# Patient Record
Sex: MALE | Race: WHITE | Employment: OTHER | URBAN - NONMETROPOLITAN AREA
[De-identification: names, ages, dates, MRNs, and addresses within clinical notes are randomized per-mention and may not be internally consistent; named-entity substitution may affect disease eponyms.]

---

## 2021-11-29 ENCOUNTER — OFFICE VISIT (OUTPATIENT)
Dept: ONCOLOGY | Age: 71
End: 2021-11-29
Payer: MEDICARE

## 2021-11-29 ENCOUNTER — HOSPITAL ENCOUNTER (OUTPATIENT)
Dept: INFUSION THERAPY | Age: 71
Discharge: HOME | End: 2021-11-29
Payer: MEDICARE

## 2021-11-29 VITALS
TEMPERATURE: 98.7 F | BODY MASS INDEX: 29.67 KG/M2 | RESPIRATION RATE: 18 BRPM | DIASTOLIC BLOOD PRESSURE: 76 MMHG | OXYGEN SATURATION: 95 % | SYSTOLIC BLOOD PRESSURE: 117 MMHG | HEIGHT: 68 IN | WEIGHT: 195.8 LBS | HEART RATE: 58 BPM

## 2021-11-29 DIAGNOSIS — D75.839 THROMBOCYTOSIS: Primary | ICD-10-CM

## 2021-11-29 DIAGNOSIS — R79.9 ABNORMAL FINDING OF BLOOD CHEMISTRY, UNSPECIFIED: ICD-10-CM

## 2021-11-29 DIAGNOSIS — D75.839 THROMBOCYTOSIS: ICD-10-CM

## 2021-11-29 LAB
BASOPHILS # BLD: 0.7 %
BASOPHILS ABSOLUTE: 0 THOU/MM3 (ref 0–0.1)
EOSINOPHIL # BLD: 3.5 %
EOSINOPHILS ABSOLUTE: 0.2 THOU/MM3 (ref 0–0.4)
ERYTHROCYTE [DISTWIDTH] IN BLOOD BY AUTOMATED COUNT: 13 % (ref 11.5–14.5)
ERYTHROCYTE [DISTWIDTH] IN BLOOD BY AUTOMATED COUNT: 46.5 FL (ref 35–45)
HCT VFR BLD CALC: 45.3 % (ref 42–52)
HEMOGLOBIN: 14.9 GM/DL (ref 14–18)
IMMATURE GRANS (ABS): 0.02 THOU/MM3 (ref 0–0.07)
IMMATURE GRANULOCYTES: 0.3 %
LYMPHOCYTES # BLD: 33.4 %
LYMPHOCYTES ABSOLUTE: 2.3 THOU/MM3 (ref 1–4.8)
MCH RBC QN AUTO: 32 PG (ref 26–33)
MCHC RBC AUTO-ENTMCNC: 32.9 GM/DL (ref 32.2–35.5)
MCV RBC AUTO: 97.4 FL (ref 80–94)
MONOCYTES # BLD: 8.3 %
MONOCYTES ABSOLUTE: 0.6 THOU/MM3 (ref 0.4–1.3)
NUCLEATED RED BLOOD CELLS: 0 /100 WBC
PLATELET # BLD: 795 THOU/MM3 (ref 130–400)
PMV BLD AUTO: 10.2 FL (ref 9.4–12.4)
RBC # BLD: 4.65 MILL/MM3 (ref 4.7–6.1)
SEDIMENTATION RATE, ERYTHROCYTE: 1 MM/HR (ref 0–10)
SEG NEUTROPHILS: 53.8 %
SEGMENTED NEUTROPHILS ABSOLUTE COUNT: 3.7 THOU/MM3 (ref 1.8–7.7)
WBC # BLD: 6.9 THOU/MM3 (ref 4.8–10.8)

## 2021-11-29 PROCEDURE — 99211 OFF/OP EST MAY X REQ PHY/QHP: CPT

## 2021-11-29 PROCEDURE — 36415 COLL VENOUS BLD VENIPUNCTURE: CPT

## 2021-11-29 PROCEDURE — 4040F PNEUMOC VAC/ADMIN/RCVD: CPT | Performed by: INTERNAL MEDICINE

## 2021-11-29 PROCEDURE — 3017F COLORECTAL CA SCREEN DOC REV: CPT | Performed by: INTERNAL MEDICINE

## 2021-11-29 PROCEDURE — 82728 ASSAY OF FERRITIN: CPT

## 2021-11-29 PROCEDURE — 1036F TOBACCO NON-USER: CPT | Performed by: INTERNAL MEDICINE

## 2021-11-29 PROCEDURE — 1123F ACP DISCUSS/DSCN MKR DOCD: CPT | Performed by: INTERNAL MEDICINE

## 2021-11-29 PROCEDURE — G8484 FLU IMMUNIZE NO ADMIN: HCPCS | Performed by: INTERNAL MEDICINE

## 2021-11-29 PROCEDURE — 81270 JAK2 GENE: CPT

## 2021-11-29 PROCEDURE — 83615 LACTATE (LD) (LDH) ENZYME: CPT

## 2021-11-29 PROCEDURE — G8427 DOCREV CUR MEDS BY ELIG CLIN: HCPCS | Performed by: INTERNAL MEDICINE

## 2021-11-29 PROCEDURE — 85025 COMPLETE CBC W/AUTO DIFF WBC: CPT

## 2021-11-29 PROCEDURE — 80053 COMPREHEN METABOLIC PANEL: CPT

## 2021-11-29 PROCEDURE — 99203 OFFICE O/P NEW LOW 30 MIN: CPT | Performed by: INTERNAL MEDICINE

## 2021-11-29 PROCEDURE — 85651 RBC SED RATE NONAUTOMATED: CPT

## 2021-11-29 PROCEDURE — 82248 BILIRUBIN DIRECT: CPT

## 2021-11-29 PROCEDURE — G8417 CALC BMI ABV UP PARAM F/U: HCPCS | Performed by: INTERNAL MEDICINE

## 2021-11-29 RX ORDER — UREA 10 %
LOTION (ML) TOPICAL
COMMUNITY

## 2021-11-29 RX ORDER — MELOXICAM 15 MG/1
TABLET ORAL
COMMUNITY

## 2021-11-29 RX ORDER — AMLODIPINE BESYLATE 5 MG/1
TABLET ORAL
COMMUNITY
Start: 2021-08-24 | End: 2022-10-18

## 2021-11-29 RX ORDER — TAMSULOSIN HYDROCHLORIDE 0.4 MG/1
CAPSULE ORAL
COMMUNITY
Start: 2021-11-23

## 2021-11-29 RX ORDER — ASPIRIN 81 MG/1
TABLET ORAL
COMMUNITY

## 2021-11-29 RX ORDER — METHOTREXATE 2.5 MG/1
2.5 TABLET ORAL
COMMUNITY
Start: 2021-11-23

## 2021-11-29 RX ORDER — ASCORBIC ACID 500 MG
TABLET ORAL
COMMUNITY

## 2021-11-29 RX ORDER — MAGNESIUM GLUCONATE 27 MG(500)
500 TABLET ORAL 2 TIMES DAILY
COMMUNITY

## 2021-11-29 RX ORDER — ENALAPRIL MALEATE AND HYDROCHLOROTHIAZIDE 10; 25 MG/1; MG/1
1 TABLET ORAL DAILY
COMMUNITY
Start: 2021-11-23

## 2021-11-30 LAB
ALBUMIN SERPL-MCNC: 4.2 G/DL (ref 3.5–5.1)
ALP BLD-CCNC: 64 U/L (ref 38–126)
ALT SERPL-CCNC: 13 U/L (ref 11–66)
ANION GAP SERPL CALCULATED.3IONS-SCNC: 10 MEQ/L (ref 8–16)
AST SERPL-CCNC: 26 U/L (ref 5–40)
BILIRUB SERPL-MCNC: 0.6 MG/DL (ref 0.3–1.2)
BILIRUBIN DIRECT: < 0.2 MG/DL (ref 0–0.3)
BUN BLDV-MCNC: 19 MG/DL (ref 7–22)
CALCIUM SERPL-MCNC: 9.3 MG/DL (ref 8.5–10.5)
CHLORIDE BLD-SCNC: 102 MEQ/L (ref 98–111)
CO2: 28 MEQ/L (ref 23–33)
CREAT SERPL-MCNC: 0.9 MG/DL (ref 0.4–1.2)
FERRITIN: 58 NG/ML (ref 22–322)
GFR SERPL CREATININE-BSD FRML MDRD: 83 ML/MIN/1.73M2
GLUCOSE BLD-MCNC: 92 MG/DL (ref 70–108)
LD: 218 U/L (ref 100–190)
POTASSIUM SERPL-SCNC: 3.9 MEQ/L (ref 3.5–5.2)
SODIUM BLD-SCNC: 140 MEQ/L (ref 135–145)
TOTAL PROTEIN: 6.4 G/DL (ref 6.1–8)

## 2021-12-16 LAB
JAK2 GENE MUTATION QUAL: NORMAL
Lab: NORMAL

## 2021-12-22 NOTE — PROGRESS NOTES
[facility] CANCER CENTER  CANCER NETWORK OF [facility]  [facility] [address] [facility]  [address] [place] [address]  Dept: [phone]  Dept Fax: [phone]: [phone]     Encounter Date: 11/29/2021    Referring Physician:  Bryon Mars MD     Primary Provider: Nadia Lincoln MD     Subjective:      Chief Complaint:  Maurisio Bearden is a 80 y.o. with an elevated platelet count. HPI:  This is the first visit to the [facility] for this patient who was referred by Bryon Mars MD for evaluation of thrombocytosis. The patient has been in fairly good health the majority of his life. He was first noted to have an elevated platelet count in December 2020. At that time his platelet count was 211,547. He had a similar result in March 2021. Since that time his platelet count has increased. In June 2021 his platelet count was 633,975. In September 2021 on 2 occasions it was 721,007 127,000 respectively. In context of this condition the patient's total white blood cell count, white blood cell differential, hemoglobin, hematocrit have been unremarkable. The patient does not report any family history of hematological disease. He currently takes an aspirin on a daily basis. The patient does not have any specific signs or symptoms related to his thrombocytosis. He has had no abnormal bleeding or bruising. The patient has had no evidence of thrombosis. He has not had fever, cough, shortness of breath or other signs of infection. The patient does complain of chronic joint pain and is on meloxicam for this discomfort. He is active and has an ECOG performance status of level 0. Past Medical History  She  has a past medical history of Arthritis, Cancer ([address] and Hypertension. Surgical History  She  has a past surgical history that includes hernia repair (Right, 2013);  Appendectomy; back surgery (1996); and Dental surgery (2013). Home Medications  She has a current medication list which includes the following prescription(s): amlodipine, meloxicam, enalapril-hydrochlorothiazide, methotrexate, tamsulosin, aspirin, ascorbic acid, calcium carbonate, vitamin d, folic acid, magnesium gluconate, and potassium. Allergies  No Known Allergies    Social History  She  reports that he quit smoking about 46 years ago. His smoking use included cigarettes. He started smoking about 48 years ago. He has a 2.00 pack-year smoking history. He has never used smokeless tobacco. He reports previous alcohol use. He reports that he does not use drugs. Family History  Her family history includes Arthritis in his mother; Dementia in his mother; Diabetes in his father and mother. Review of Systems  Constitutional: Negative. HENT: Hearing loss. Eyes: Vision changes. Respiratory: Negative. Cardiovascular: Negative. Gastrointestinal: Negative. Genitourinary: Frequency, lack of control. Musculoskeletal: Joint pain, osteoporosis. Skin: Negative. Neurological: Negative. Hematological: Negative. Psychiatric/Behavioral: Negative. Objective:   Physical Exam  Vitals:    11/29/21 1512   BP: 117/76   Pulse: 58   Resp: 18   Temp: 98.7 °F (37.1 °C)   SpO2: 95%   Vitals reviewed and are stable. Constitutional: Well-developed. No acute distress. HENT: Normocephalic and atraumatic. Eyes: Pupils appear equal and reactive. Neck: Overall appearance is symmetrical. No identifiable masses. Pulmonary: Effort normal. No respiratory distress. .  Neurological: Alert and oriented to person, place, and time. Judgment and thought content normal.  Skin: Skin is warm and dry. No rash. Psychiatric: Mood and affect appropriate for the clinical situation. Assessment:   1. Thrombocytosis     Plan:   1.   Obtain the following laboratory studies today to further evaluate his elevated platelet count:   Hepatic Function Panel    Sedimentation Rate    JAK2 V617F with Reflex to CALR & MPL    Lactate Dehydrogenase    Ferritin    CBC Auto Differential   2. Continue daily aspirin. 3.  Monitor platelet count and for any signs of abnormal bleeding/bruising. 4.  Consider therapy with hydroxyurea or Agrylin depending on results of the above laboratory studies and further elevation of his platelet count. Multiple questions were answered throughout the course the consultation. By the end of the consultation, all the patient's questions had been answered. The patient was asked to call if there were any questions or concerns prior to the next scheduled clinic visit. Page Caal M.D. Medical Director: [facility]  Cancer Network [facility]  [address], [address] [place] [address], [address] [place] [address], [address] [place] [address] of the [facility] at the [facility]      **This report has been created using voice recognition software. It may contain minor errors which are inherent in voice recognition technology. **

## 2022-01-11 ENCOUNTER — HOSPITAL ENCOUNTER (OUTPATIENT)
Dept: INFUSION THERAPY | Age: 72
Discharge: HOME | End: 2022-01-11
Payer: MEDICARE

## 2022-01-11 ENCOUNTER — OFFICE VISIT (OUTPATIENT)
Dept: ONCOLOGY | Age: 72
End: 2022-01-11
Payer: MEDICARE

## 2022-01-11 VITALS
OXYGEN SATURATION: 97 % | DIASTOLIC BLOOD PRESSURE: 73 MMHG | SYSTOLIC BLOOD PRESSURE: 111 MMHG | RESPIRATION RATE: 16 BRPM | HEIGHT: 68 IN | WEIGHT: 200.6 LBS | TEMPERATURE: 98.1 F | BODY MASS INDEX: 30.4 KG/M2 | HEART RATE: 71 BPM

## 2022-01-11 DIAGNOSIS — D47.1 MYELOPROLIFERATIVE DISORDER (HCC): ICD-10-CM

## 2022-01-11 DIAGNOSIS — Z15.89 JAK2 GENE MUTATION: ICD-10-CM

## 2022-01-11 DIAGNOSIS — R79.9 ABNORMAL FINDING OF BLOOD CHEMISTRY, UNSPECIFIED: ICD-10-CM

## 2022-01-11 DIAGNOSIS — M19.90 ARTHRITIS: ICD-10-CM

## 2022-01-11 DIAGNOSIS — D75.839 THROMBOCYTOSIS: ICD-10-CM

## 2022-01-11 DIAGNOSIS — D75.839 THROMBOCYTOSIS: Primary | ICD-10-CM

## 2022-01-11 LAB
ABSOLUTE IMMATURE GRANULOCYTE: 0.02 THOU/MM3 (ref 0–0.07)
BASINOPHIL, AUTOMATED: 1 % (ref 0–3)
BASOPHILS ABSOLUTE: 0 THOU/MM3 (ref 0–0.1)
EOSINOPHILS ABSOLUTE: 0.2 THOU/MM3 (ref 0–0.4)
EOSINOPHILS RELATIVE PERCENT: 3 % (ref 0–4)
HCT VFR BLD CALC: 41.8 % (ref 42–52)
HEMOGLOBIN: 14 GM/DL (ref 14–18)
IMMATURE GRANULOCYTES: 0 %
LYMPHOCYTES # BLD: 20 % (ref 15–47)
LYMPHOCYTES ABSOLUTE: 1.3 THOU/MM3 (ref 1–4.8)
MCH RBC QN AUTO: 31.6 PG (ref 26–33)
MCHC RBC AUTO-ENTMCNC: 33.5 GM/DL (ref 32.2–35.5)
MCV RBC AUTO: 94 FL (ref 80–94)
MONOCYTES ABSOLUTE: 0.5 THOU/MM3 (ref 0.4–1.3)
MONOCYTES: 8 % (ref 0–12)
PDW BLD-RTO: 13.2 % (ref 11.5–14.5)
PLATELET # BLD: 783 THOU/MM3 (ref 130–400)
PMV BLD AUTO: 9.8 FL (ref 9.4–12.4)
RBC # BLD: 4.43 MILL/MM3 (ref 4.7–6.1)
SEG NEUTROPHILS: 68 % (ref 43–75)
SEGMENTED NEUTROPHILS ABSOLUTE COUNT: 4.2 THOU/MM3 (ref 1.8–7.7)
WBC # BLD: 6.2 THOU/MM3 (ref 4.8–10.8)

## 2022-01-11 PROCEDURE — 1123F ACP DISCUSS/DSCN MKR DOCD: CPT | Performed by: INTERNAL MEDICINE

## 2022-01-11 PROCEDURE — 3017F COLORECTAL CA SCREEN DOC REV: CPT | Performed by: INTERNAL MEDICINE

## 2022-01-11 PROCEDURE — 4040F PNEUMOC VAC/ADMIN/RCVD: CPT | Performed by: INTERNAL MEDICINE

## 2022-01-11 PROCEDURE — G8484 FLU IMMUNIZE NO ADMIN: HCPCS | Performed by: INTERNAL MEDICINE

## 2022-01-11 PROCEDURE — 1036F TOBACCO NON-USER: CPT | Performed by: INTERNAL MEDICINE

## 2022-01-11 PROCEDURE — 99211 OFF/OP EST MAY X REQ PHY/QHP: CPT

## 2022-01-11 PROCEDURE — 99214 OFFICE O/P EST MOD 30 MIN: CPT | Performed by: INTERNAL MEDICINE

## 2022-01-11 PROCEDURE — G8417 CALC BMI ABV UP PARAM F/U: HCPCS | Performed by: INTERNAL MEDICINE

## 2022-01-11 PROCEDURE — G8427 DOCREV CUR MEDS BY ELIG CLIN: HCPCS | Performed by: INTERNAL MEDICINE

## 2022-01-11 PROCEDURE — 85025 COMPLETE CBC W/AUTO DIFF WBC: CPT

## 2022-01-11 NOTE — PROGRESS NOTES
[facility] CANCER CENTER  CANCER NETWORK OF [facility]  [facility] [address] [facility] 98  [address], [facility] [address]  [address]  Dept: [phone]  Dept Fax: [phone]: [phone]     Encounter Date: 01/11/2022    Primary Provider: Akosua Jacobs MD     Subjective:      Chief Complaint:  Yesenia Dillard is a 80 y.o. with an elevated platelet count. He was first noted to have an elevated platelet count in December 2020. At that time his platelet count was 056,269. He had a similar result in March 2021. Since that time his platelet count has increased. In June 2021 his platelet count was 539,253. In September 2021 on 2 occasions it was 721,007 127,000 respectively    HPI:   The patient returns today for follow-up regarding his history of thrombocytosis. On his initial visit here the patient had laboratory studies completed and he has been confirmed to be JAK2 positive for mutation consistent with a myeloproliferative disorder. This was reviewed with the patient today. Laboratory studies from today were reviewed with the patient. The patient continues to have an elevated platelet count. He does not have any specific symptoms related to his thrombocytosis. Of note, the patient is on methotrexate for treatment of arthritis. He has not had fever, cough, shortness of breath or other signs of infection. The patient's bowel and bladder habits have been normal.  He has not seen blood in his stool or urine. The patient remains active with an ECOG performance status of level 0. PMH, SH, and FH:  I reviewed the patients medication list and allergy list as noted on the electronic medical record. The PMH, SH and FH were also reviewed as noted on the EMR. Review of Systems  Constitutional: Negative. HENT: Negative. Eyes: Negative. Respiratory: Negative. Cardiovascular: Negative. Gastrointestinal: Negative.   Genitourinary: Negative. Musculoskeletal: Joint pain. Skin: Negative. Neurological: Negative. Hematological: Negative. Psychiatric/Behavioral: Negative. Objective:   Physical Exam  Vitals:    01/11/22 1111   BP: 111/73   Pulse: 71   Resp: 16   Temp: 98.1 °F (36.7 °C)   SpO2: 97%   Vitals reviewed and are stable. Constitutional: Well-developed. No acute distress. HENT: Normocephalic and atraumatic. Eyes: Pupils appear equal and reactive. Neck: Overall appearance is symmetrical. No identifiable masses. Pulmonary: Effort normal. No respiratory distress. .  Neurological: Alert and oriented to person, place, and time. Judgment and thought content normal.  Skin: Skin is warm and dry. No rash. Psychiatric: Mood and affect appropriate for the clinical situation. Data Analysis: The following laboratory studies were reviewed with the patient today:    Hematology 1/11/2022 11/29/2021   WBC 6.2 6.9   RBC 4.43 (L) 4.65 (L)   HGB 14.0 14.9   HCT 41.8 (L) 45.3   MCV 94 97.4 (H)   RDW 13.2     (H) 795 (H)   ESR  1   Ferritin  58     Assessment:   1. Essential thrombocytosis. 2.  JAK2 mutation myeloproliferative disorder. 3.  Arthritis. Plan:   1. Monthly CBC to monitor the trend of the patient's thrombocytosis. 2.  Monitor platelet count and for any signs of abnormal bleeding/bruising. 3.  Continue current therapy with methotrexate as outlined for arthritis by patient's primary care provider. Multiple questions were answered throughout the course the consultation. By the end of the consultation, all the patient's questions had been answered. The patient was asked to call if there were any questions or concerns prior to the next scheduled clinic visit. Karli Chatman M.D.                                                                          Medical Director: [facility]  Cancer Network of [facility] [address] [place] [address], [address] [place] [address], [address] [place] [address], [address] of the [facility] Theron BRAGA at the [facility]      **This report has been created using voice recognition software. It may contain minor errors which are inherent in voice recognition technology. **

## 2022-05-09 NOTE — PROGRESS NOTES
[facility] CENTER  CANCER NETWORK OF [facility]  [facility] [address] [facility] 98  [address], [facility] [address]  [address]  Dept: [phone]  Dept Fax: [phone]: [phone]     Encounter Date: 05/10/2022    Primary Provider: Peter Banegas MD     Subjective:      Chief Complaint:  Sharron Hill is a 80 y.o. with an elevated platelet count. He was first noted to have an elevated platelet count in December 2020. At that time his platelet count was 714,263. He had a similar result in March 2021. Since that time his platelet count has increased. In June 2021 his platelet count was 575,132. HPI:   Miri Console returns today for follow-up regarding his history of a JAK2 positive myeloproliferative disorder. His myeloproliferative disease primarily manifested as essential thrombocytosis. To date he has not required treatment however on today's evaluation the patient's platelet count has significantly elevated. His platelet count today is 948,000. Once again his white blood cell count, hemoglobin, hematocrit remain normal.  I discussed with the patient the use of Hydrea to help control his platelet count. The potential benefits as well as mental side effects and toxicity of Hydrea therapy reviewed. The patient is willing to proceed. Despite the elevation his platelet count the patient has no specific hematological symptoms. Laboratory studies from today were reviewed with the patient. The patient has thrombocytosis. He does not have any specific symptoms related to his elevated platelet count. The patient does not report any evidence of abnormal bleeding or blood loss. He has not had fever, cough, shortness of breath or other signs of infection. The patient's bowel and bladder habits have been normal.  He has not seen blood in his stool or urine. The patient remains active with an ECOG performance status of level 0.     [facility], SH, and FH:  I reviewed the patients medication list and allergy list as noted on the electronic medical record. The PMH, SH and FH were also reviewed as noted on the EMR. Review of Systems  Constitutional: Negative. HENT: Negative. Eyes: Negative. Respiratory: Negative. Cardiovascular: Negative. Gastrointestinal: Negative. Genitourinary: Negative. Musculoskeletal: Chronic arthritis. Skin: Negative. Neurological: Negative. Hematological: Negative. Psychiatric/Behavioral: Negative. Objective:   Physical Exam  Vitals:    05/10/22 0921   BP: 127/68   Pulse: 66   Resp: 18   Temp: 98.9 °F (37.2 °C)   SpO2: 97%   Vitals reviewed and are stable. Constitutional: Well-developed. No acute distress. HENT: Normocephalic and atraumatic. Eyes: Pupils appear equal and reactive. Neck: Overall appearance is symmetrical. No identifiable masses. Pulmonary: Effort normal. No respiratory distress. .  Neurological: Alert and oriented to person, place, and time. Judgment and thought content normal.  Skin: Skin is warm and dry. No rash. Psychiatric: Mood and affect appropriate for the clinical situation. Data Analysis: The following laboratory studies were reviewed with the patient today:    Hematology 5/10/2022 1/11/2022 11/29/2021   WBC 6.8 6.2 6.9   RBC 4.55 (L) 4.43 (L) 4.65 (L)   HGB 14.5 14.0 14.9   HCT 44.5 41.8 (L) 45.3   MCV 98 (H) 94 97.4 (H)   RDW 13.3 13.2     (H) 783 (H) 795 (H)     Assessment:   1. Essential thrombocytosis. 2.  JAK2 mutation myeloproliferative disorder. 3.  Arthritis. Plan:   1. Initiate therapy with hydroxyurea 500 mg daily. 2.  Monitor CBC once every 2 weeks to evaluate effect of hydroxyurea on his hematological disease. 3.  Monitor platelet count and for any signs of abnormal bleeding/bruising. 4.  Continue current therapy with methotrexate as outlined for arthritis by patient's primary care provider.     Multiple questions were answered throughout the course the consultation. By the end the consultation all the patient's questions have been answered. He is willing to initiate therapy with hydroxyurea. The patient was asked to call if is any questions concerns or problems with hydroxyurea prior to his next clinic visit. Ellamae Burkitt M.D. Medical Director: [facility]  Cancer [place] [address] [address] [employer] [facility], [address] [place] [address], [address] [place] [address], [address] [place] [address] of the [facility] at [facility]      **This report has been created using voice recognition software. It may contain minor errors which are inherent in voice recognition technology. **

## 2022-05-10 ENCOUNTER — OFFICE VISIT (OUTPATIENT)
Dept: ONCOLOGY | Age: 72
End: 2022-05-10
Payer: MEDICARE

## 2022-05-10 ENCOUNTER — HOSPITAL ENCOUNTER (OUTPATIENT)
Dept: INFUSION THERAPY | Age: 72
Discharge: HOME | End: 2022-05-10
Payer: MEDICARE

## 2022-05-10 VITALS
HEIGHT: 68 IN | OXYGEN SATURATION: 97 % | DIASTOLIC BLOOD PRESSURE: 68 MMHG | HEART RATE: 66 BPM | SYSTOLIC BLOOD PRESSURE: 127 MMHG | BODY MASS INDEX: 30.16 KG/M2 | WEIGHT: 199 LBS | TEMPERATURE: 98.9 F | RESPIRATION RATE: 18 BRPM

## 2022-05-10 VITALS
RESPIRATION RATE: 18 BRPM | OXYGEN SATURATION: 97 % | DIASTOLIC BLOOD PRESSURE: 68 MMHG | SYSTOLIC BLOOD PRESSURE: 127 MMHG | WEIGHT: 199 LBS | BODY MASS INDEX: 30.16 KG/M2 | HEART RATE: 66 BPM | TEMPERATURE: 98.9 F | HEIGHT: 68 IN

## 2022-05-10 DIAGNOSIS — Z79.64 ENCOUNTER FOR MONITORING OF HYDROXYUREA THERAPY: ICD-10-CM

## 2022-05-10 DIAGNOSIS — D47.1 MYELOPROLIFERATIVE DISORDER (HCC): ICD-10-CM

## 2022-05-10 DIAGNOSIS — D75.839 THROMBOCYTOSIS: Primary | ICD-10-CM

## 2022-05-10 DIAGNOSIS — D75.839 THROMBOCYTOSIS: ICD-10-CM

## 2022-05-10 DIAGNOSIS — M19.90 ARTHRITIS: ICD-10-CM

## 2022-05-10 DIAGNOSIS — Z51.81 ENCOUNTER FOR MONITORING OF HYDROXYUREA THERAPY: ICD-10-CM

## 2022-05-10 LAB
ABSOLUTE IMMATURE GRANULOCYTE: 0.02 THOU/MM3 (ref 0–0.07)
BASINOPHIL, AUTOMATED: 0 % (ref 0–3)
BASOPHILS ABSOLUTE: 0 THOU/MM3 (ref 0–0.1)
EOSINOPHILS ABSOLUTE: 0.2 THOU/MM3 (ref 0–0.4)
EOSINOPHILS RELATIVE PERCENT: 3 % (ref 0–4)
HCT VFR BLD CALC: 44.5 % (ref 42–52)
HEMOGLOBIN: 14.5 GM/DL (ref 14–18)
IMMATURE GRANULOCYTES: 0 %
LYMPHOCYTES # BLD: 18 % (ref 15–47)
LYMPHOCYTES ABSOLUTE: 1.2 THOU/MM3 (ref 1–4.8)
MCH RBC QN AUTO: 31.9 PG (ref 26–33)
MCHC RBC AUTO-ENTMCNC: 32.6 GM/DL (ref 32.2–35.5)
MCV RBC AUTO: 98 FL (ref 80–94)
MONOCYTES ABSOLUTE: 0.5 THOU/MM3 (ref 0.4–1.3)
MONOCYTES: 7 % (ref 0–12)
PDW BLD-RTO: 13.3 % (ref 11.5–14.5)
PLATELET # BLD: 948 THOU/MM3 (ref 130–400)
PMV BLD AUTO: 9.8 FL (ref 9.4–12.4)
RBC # BLD: 4.55 MILL/MM3 (ref 4.7–6.1)
SEG NEUTROPHILS: 71 % (ref 43–75)
SEGMENTED NEUTROPHILS ABSOLUTE COUNT: 4.8 THOU/MM3 (ref 1.8–7.7)
WBC # BLD: 6.8 THOU/MM3 (ref 4.8–10.8)

## 2022-05-10 PROCEDURE — 99215 OFFICE O/P EST HI 40 MIN: CPT | Performed by: INTERNAL MEDICINE

## 2022-05-10 PROCEDURE — 3017F COLORECTAL CA SCREEN DOC REV: CPT | Performed by: INTERNAL MEDICINE

## 2022-05-10 PROCEDURE — G8427 DOCREV CUR MEDS BY ELIG CLIN: HCPCS | Performed by: INTERNAL MEDICINE

## 2022-05-10 PROCEDURE — 85025 COMPLETE CBC W/AUTO DIFF WBC: CPT

## 2022-05-10 PROCEDURE — 99211 OFF/OP EST MAY X REQ PHY/QHP: CPT

## 2022-05-10 PROCEDURE — 1036F TOBACCO NON-USER: CPT | Performed by: INTERNAL MEDICINE

## 2022-05-10 PROCEDURE — 1123F ACP DISCUSS/DSCN MKR DOCD: CPT | Performed by: INTERNAL MEDICINE

## 2022-05-10 PROCEDURE — G8417 CALC BMI ABV UP PARAM F/U: HCPCS | Performed by: INTERNAL MEDICINE

## 2022-05-10 RX ORDER — HYDROXYUREA 500 MG/1
500 CAPSULE ORAL DAILY
Qty: 30 CAPSULE | Refills: 5 | Status: SHIPPED | OUTPATIENT
Start: 2022-05-10 | End: 2022-06-09

## 2022-05-10 RX ORDER — PSEUDOEPHEDRINE HCL 30 MG
250 TABLET ORAL 2 TIMES DAILY
COMMUNITY

## 2022-07-18 NOTE — PROGRESS NOTES
[facility] CENTER  CANCER NETWORK OF [facility]  [facility] [address] [facility] 98  [address], [facility] [address]  [address]  Dept: [phone]  Dept Fax: [phone]: [phone]     Encounter Date: 05/10/2022    Primary Provider: Yosvany Mercado MD     Subjective:      Chief Complaint:  Nyla Gardner is a 80 y.o. with an elevated platelet count. He was first noted to have an elevated platelet count in December 2020. At that time his platelet count was 093,360. He had a similar result in March 2021. Since that time his platelet count has increased. In June 2021 his platelet count was 054,872. HPI:   The patient is here today for follow-up regarding his history of essential thrombocytosis. He has a JAK2 positive myeloproliferative disorder. March 2022 the patient initiated therapy with Hydrea. At that time his platelet count was 783,685. The patient has tolerated therapy with Hydrea well without significant complications. He currently is on a dose of Hydrea 500 mg tablet daily. The patient has not had grade 3 or grade 4 toxicity. In addition he had a good clinical response. Today's platelet count is 888,468. He has not had fever, cough, shortness of breath or other signs of infection. The patient's bowel and bladder habits have been normal.  He has not seen blood in his stool or urine. The patient remains active with an ECOG performance status of level 0. PMH, SH, and FH:  I reviewed the patients medication list and allergy list as noted on the electronic medical record. The PMH, SH and FH were also reviewed as noted on the EMR. Review of Systems  Constitutional: Negative. HENT: Negative. Eyes: Negative. Respiratory: Negative. Cardiovascular: Negative. Gastrointestinal: Negative. Genitourinary: Negative. Musculoskeletal: Joint pain. Skin: Negative. Neurological: Negative.    Hematological: Negative. Psychiatric/Behavioral: Negative. Objective:   Physical Exam  Vitals:    07/19/22 0946   BP: 113/69   Pulse: 64   Resp: 18   Temp: 98.2 °F (36.8 °C)   SpO2: 97%   Vitals reviewed and are stable. Constitutional: Well-developed. No acute distress. HENT: Normocephalic and atraumatic. Eyes: Pupils appear equal and reactive. Neck: Overall appearance is symmetrical. No identifiable masses. Pulmonary: Effort normal. No respiratory distress. .  Neurological: Alert and oriented to person, place, and time. Judgment and thought content normal.  Skin: Skin is warm and dry. No rash. Psychiatric: Mood and affect appropriate for the clinical situation. Data Analysis: The following laboratory studies were reviewed with the patient today:    Hematology 7/19/2022 5/10/2022 1/11/2022   WBC 6.3 6.8 6.2   RBC 4.30 (L) 4.55 (L) 4.43 (L)   HGB 14.3 14.5 14.0   HCT 42.1 44.5 41.8 (L)   MCV 98 (H) 98 (H) 94   RDW 16.0 (H) 13.3 13.2    (H) 948 (H) 783 (H)     Assessment:   1. Essential thrombocytosis. 2.  JAK2 mutation myeloproliferative disorder. 3.  Arthritis. Plan:   1. Continue therapy with hydroxyurea 500 mg daily. 2.  Monitor platelet count and for any signs of abnormal bleeding/bruising. 3.  Continue current therapy as outlined for arthritis by patient's primary care provider. Lawanda Tolentino M.D. Medical Director: [facility]  Cancer Network [facility]  [address] [facility], [address] [place] [address], [address] [place] [address] of the [facility] at [facility]      **This report has been created using voice recognition software.   It may contain minor errors which are inherent in voice recognition technology. **

## 2022-07-19 ENCOUNTER — OFFICE VISIT (OUTPATIENT)
Dept: ONCOLOGY | Age: 72
End: 2022-07-19
Payer: MEDICARE

## 2022-07-19 ENCOUNTER — HOSPITAL ENCOUNTER (OUTPATIENT)
Dept: INFUSION THERAPY | Age: 72
Discharge: HOME | End: 2022-07-19
Payer: MEDICARE

## 2022-07-19 VITALS
HEIGHT: 68 IN | OXYGEN SATURATION: 97 % | HEART RATE: 64 BPM | WEIGHT: 196.2 LBS | RESPIRATION RATE: 18 BRPM | BODY MASS INDEX: 29.73 KG/M2 | TEMPERATURE: 98.2 F | DIASTOLIC BLOOD PRESSURE: 69 MMHG | SYSTOLIC BLOOD PRESSURE: 113 MMHG

## 2022-07-19 DIAGNOSIS — M19.90 ARTHRITIS: ICD-10-CM

## 2022-07-19 DIAGNOSIS — D75.839 THROMBOCYTOSIS: Primary | ICD-10-CM

## 2022-07-19 DIAGNOSIS — Z51.81 ENCOUNTER FOR MONITORING OF HYDROXYUREA THERAPY: ICD-10-CM

## 2022-07-19 DIAGNOSIS — D75.839 THROMBOCYTOSIS: ICD-10-CM

## 2022-07-19 DIAGNOSIS — Z15.89 JAK2 GENE MUTATION: ICD-10-CM

## 2022-07-19 DIAGNOSIS — D47.1 MYELOPROLIFERATIVE DISORDER (HCC): ICD-10-CM

## 2022-07-19 DIAGNOSIS — Z79.64 ENCOUNTER FOR MONITORING OF HYDROXYUREA THERAPY: ICD-10-CM

## 2022-07-19 LAB
ABSOLUTE IMMATURE GRANULOCYTE: 0.02 THOU/MM3 (ref 0–0.07)
BASINOPHIL, AUTOMATED: 0 % (ref 0–3)
BASOPHILS ABSOLUTE: 0 THOU/MM3 (ref 0–0.1)
BUN, WHOLE BLOOD: 23 MG/DL (ref 8–26)
CALCIUM SERPL-MCNC: 9.7 MG/DL (ref 8.5–10.5)
CHLORIDE, WHOLE BLOOD: 103 MEQ/L (ref 98–109)
CREATININE, WHOLE BLOOD: 1.1 MG/DL (ref 0.5–1.2)
EOSINOPHILS ABSOLUTE: 0.1 THOU/MM3 (ref 0–0.4)
EOSINOPHILS RELATIVE PERCENT: 2 % (ref 0–4)
GFR, ESTIMATED ,CON: 70 ML/MIN/1.73M2
GLUCOSE, WHOLE BLOOD: 86 MG/DL (ref 70–108)
HCT VFR BLD CALC: 42.1 % (ref 42–52)
HEMOGLOBIN: 14.3 GM/DL (ref 14–18)
IMMATURE GRANULOCYTES: 0 %
IONIZED CALCIUM, WHOLE BLOOD: 1.21 MMOL/L (ref 1.12–1.32)
LYMPHOCYTES # BLD: 20 % (ref 15–47)
LYMPHOCYTES ABSOLUTE: 1.3 THOU/MM3 (ref 1–4.8)
MAGNESIUM: 1.9 MG/DL (ref 1.6–2.4)
MCH RBC QN AUTO: 33.3 PG (ref 26–33)
MCHC RBC AUTO-ENTMCNC: 34 GM/DL (ref 32.2–35.5)
MCV RBC AUTO: 98 FL (ref 80–94)
MONOCYTES ABSOLUTE: 0.5 THOU/MM3 (ref 0.4–1.3)
MONOCYTES: 8 % (ref 0–12)
PDW BLD-RTO: 16 % (ref 11.5–14.5)
PLATELET # BLD: 614 THOU/MM3 (ref 130–400)
PMV BLD AUTO: 9.8 FL (ref 9.4–12.4)
POTASSIUM, WHOLE BLOOD: 4 MEQ/L (ref 3.5–4.9)
RBC # BLD: 4.3 MILL/MM3 (ref 4.7–6.1)
SEG NEUTROPHILS: 70 % (ref 43–75)
SEGMENTED NEUTROPHILS ABSOLUTE COUNT: 4.4 THOU/MM3 (ref 1.8–7.7)
SODIUM, WHOLE BLOOD: 140 MEQ/L (ref 138–146)
TOTAL CO2, WHOLE BLOOD: 29 MEQ/L (ref 23–33)
URIC ACID: 5.4 MG/DL (ref 3.7–7)
WBC # BLD: 6.3 THOU/MM3 (ref 4.8–10.8)

## 2022-07-19 PROCEDURE — 1123F ACP DISCUSS/DSCN MKR DOCD: CPT | Performed by: INTERNAL MEDICINE

## 2022-07-19 PROCEDURE — 84550 ASSAY OF BLOOD/URIC ACID: CPT

## 2022-07-19 PROCEDURE — 85025 COMPLETE CBC W/AUTO DIFF WBC: CPT

## 2022-07-19 PROCEDURE — 99215 OFFICE O/P EST HI 40 MIN: CPT | Performed by: INTERNAL MEDICINE

## 2022-07-19 PROCEDURE — 82310 ASSAY OF CALCIUM: CPT

## 2022-07-19 PROCEDURE — 83735 ASSAY OF MAGNESIUM: CPT

## 2022-07-19 PROCEDURE — 99211 OFF/OP EST MAY X REQ PHY/QHP: CPT

## 2022-07-19 PROCEDURE — 80047 BASIC METABLC PNL IONIZED CA: CPT

## 2022-07-19 RX ORDER — HYDROXYUREA 500 MG/1
500 CAPSULE ORAL DAILY
COMMUNITY
Start: 2022-07-07 | End: 2022-08-24 | Stop reason: SDUPTHER

## 2022-08-24 RX ORDER — HYDROXYUREA 500 MG/1
500 CAPSULE ORAL DAILY
Qty: 30 CAPSULE | Refills: 2 | Status: SHIPPED | OUTPATIENT
Start: 2022-08-24 | End: 2022-09-13 | Stop reason: SDUPTHER

## 2022-09-13 RX ORDER — HYDROXYUREA 500 MG/1
500 CAPSULE ORAL DAILY
Qty: 30 CAPSULE | Refills: 2 | Status: SHIPPED | OUTPATIENT
Start: 2022-09-13 | End: 2022-10-18 | Stop reason: DRUGHIGH

## 2022-10-13 DIAGNOSIS — D47.1 MYELOPROLIFERATIVE DISORDER (HCC): Primary | ICD-10-CM

## 2022-10-18 ENCOUNTER — HOSPITAL ENCOUNTER (OUTPATIENT)
Dept: INFUSION THERAPY | Age: 72
Discharge: HOME | End: 2022-10-18
Payer: MEDICARE

## 2022-10-18 ENCOUNTER — OFFICE VISIT (OUTPATIENT)
Dept: ONCOLOGY | Age: 72
End: 2022-10-18
Payer: MEDICARE

## 2022-10-18 VITALS
WEIGHT: 196.2 LBS | RESPIRATION RATE: 20 BRPM | HEIGHT: 68 IN | DIASTOLIC BLOOD PRESSURE: 65 MMHG | OXYGEN SATURATION: 95 % | TEMPERATURE: 98 F | HEART RATE: 64 BPM | BODY MASS INDEX: 29.73 KG/M2 | SYSTOLIC BLOOD PRESSURE: 116 MMHG

## 2022-10-18 VITALS
DIASTOLIC BLOOD PRESSURE: 65 MMHG | RESPIRATION RATE: 20 BRPM | OXYGEN SATURATION: 95 % | SYSTOLIC BLOOD PRESSURE: 116 MMHG | HEIGHT: 68 IN | TEMPERATURE: 98 F | WEIGHT: 196.2 LBS | BODY MASS INDEX: 29.73 KG/M2 | HEART RATE: 64 BPM

## 2022-10-18 DIAGNOSIS — Z15.89 JAK2 GENE MUTATION: ICD-10-CM

## 2022-10-18 DIAGNOSIS — D75.839 THROMBOCYTOSIS: Primary | ICD-10-CM

## 2022-10-18 DIAGNOSIS — D47.1 MYELOPROLIFERATIVE DISORDER (HCC): ICD-10-CM

## 2022-10-18 PROCEDURE — 99213 OFFICE O/P EST LOW 20 MIN: CPT | Performed by: NURSE PRACTITIONER

## 2022-10-18 PROCEDURE — 1123F ACP DISCUSS/DSCN MKR DOCD: CPT | Performed by: NURSE PRACTITIONER

## 2022-10-18 PROCEDURE — 99211 OFF/OP EST MAY X REQ PHY/QHP: CPT

## 2022-10-18 RX ORDER — HYDROXYUREA 500 MG/1
1000 CAPSULE ORAL DAILY
Qty: 60 CAPSULE | Refills: 0 | Status: SHIPPED | OUTPATIENT
Start: 2022-10-18 | End: 2022-11-17

## 2022-10-18 NOTE — PATIENT INSTRUCTIONS
Increase Hydrea to 2 tablets daily  Repeat labs at family provider in 2 weeks, will review findings  Return to clinic in 4 weeks with repeat labs  Notify office of any new concerns

## 2022-10-18 NOTE — PROGRESS NOTES
[facility]  [address] [place] [address]  Dept: [phone]  Loc: [phone]       Visit Date:10/18/2022     Lv Hartley is a 71 y.o. male who presents today for:   Chief Complaint   Patient presents with    Follow-up     Thrombocytosis        HPI:   Lv Hartley is a 71 y.o. male with JAK2 positive myeloproliferative disorder with an elevated platelet count. He was first noted to have an elevated platelet count in December 2020. At that time his platelet count was 605,888. He had a similar result in March 2021. In June 2021 his platelet count was 778,886. Interval History 10/18/2022: Patient returns for follow-up on his thrombocytosis. He is currently on treatment with Hydrea 500 mg by mouth daily. Patient's platelet count increased to 642,000 from his previous level of 614,000. He denies any headaches, facial flushing or vision changes. No chest pain or SOB. He denies any abnormal bleeding; no epistaxis, gingival bleeding, hemoptysis, hematemesis, hematuria, hematochezia, melena. PMH, SH, and FH:  I reviewed the patients medication list and allergy list as noted on the electronic medical record. The PMH, SH and FH were also reviewed as noted on the EMR. Review of Systems:   Review of Systems   Pertinent review of systems noted in HPI, all other ROS negative. Objective:   Physical Exam   /65 (Site: Right Upper Arm, Position: Sitting, Cuff Size: Medium Adult)   Pulse 64   Temp 98 °F (36.7 °C) (Oral)   Resp 20   Ht 5' 8\" (1.727 m)   Wt 196 lb 3.2 oz (89 kg)   SpO2 95%   BMI 29.83 kg/m²    General appearance: No apparent distress, calm and cooperative. HEENT: Pupils equal, round, and reactive to light. Conjunctivae/corneas clear. Oral mucosa intact  Neck: Supple, with full range of motion. Trachea midline. Respiratory:  Normal respiratory effort.  Clear to auscultation, bilaterally without Rales/Wheezes/Rhonchi. Cardiovascular: Regular rate and rhythm with normal S1/S2 without murmurs, rubs or gallops. Abdomen: Soft, non-tender, non-distended with active bowel sounds. Musculoskeletal: No clubbing, cyanosis or edema bilaterally. Skin: Skin color, texture, turgor normal.  No visible rashes or lesions. Neurologic:  Neurovascularly intact without any focal sensory/motor deficits. Psychiatric: Alert and oriented, thought content appropriate, normal insight  Capillary Refill: Brisk,< 3 seconds   Peripheral Pulses: +2 palpable, equal bilaterally       Imaging Studies and Labs:   CBC:   Lab Results   Component Value Date    WBC 6.3 07/19/2022    HGB 14.3 07/19/2022    HCT 42.1 07/19/2022    MCV 98 (H) 07/19/2022     (H) 07/19/2022     BMP:   Lab Results   Component Value Date/Time     07/19/2022 09:44 AM     11/29/2021 04:21 PM    K 4.0 07/19/2022 09:44 AM    K 3.9 11/29/2021 04:21 PM     11/29/2021 04:21 PM    CO2 28 11/29/2021 04:21 PM    BUN 19 11/29/2021 04:21 PM    CREATININE 1.1 07/19/2022 09:44 AM    CREATININE 0.9 11/29/2021 04:21 PM    GLUCOSE 92 11/29/2021 04:21 PM    CALCIUM 9.7 07/19/2022 09:43 AM      LFT:   Lab Results   Component Value Date    ALT 13 11/29/2021    AST 26 11/29/2021    ALKPHOS 64 11/29/2021    BILITOT 0.6 11/29/2021         Assessment and Plan:   1. Thrombocytosis  - CBC with Auto Differential; Future, 2 weeks, will discuss findings and review dosing  - CBC with Auto Differential; Future    2. Myeloproliferative disorder (HCC)  - hydroxyurea (HYDREA) 500 MG chemo capsule; Take 2 capsules by mouth daily  Dispense: 60 capsule; Refill: 0    3. JAK2 gene mutation    Return in about 1 month (around 11/18/2022). All patient questions answered. Pt voiced understanding. Patient agreed with treatment plan. Follow up as directed. Patient instructed to call for questions or concerns.        Electronically signed by   JOSE C Donald CNP

## 2022-11-15 ENCOUNTER — HOSPITAL ENCOUNTER (OUTPATIENT)
Dept: INFUSION THERAPY | Age: 72
Discharge: HOME | End: 2022-11-15
Payer: MEDICARE

## 2022-11-15 ENCOUNTER — OFFICE VISIT (OUTPATIENT)
Dept: ONCOLOGY | Age: 72
End: 2022-11-15
Payer: MEDICARE

## 2022-11-15 VITALS
OXYGEN SATURATION: 97 % | DIASTOLIC BLOOD PRESSURE: 81 MMHG | TEMPERATURE: 97.1 F | HEART RATE: 68 BPM | BODY MASS INDEX: 30.34 KG/M2 | RESPIRATION RATE: 20 BRPM | SYSTOLIC BLOOD PRESSURE: 149 MMHG | WEIGHT: 200.2 LBS | HEIGHT: 68 IN

## 2022-11-15 VITALS
SYSTOLIC BLOOD PRESSURE: 149 MMHG | WEIGHT: 200.2 LBS | DIASTOLIC BLOOD PRESSURE: 81 MMHG | HEIGHT: 68 IN | BODY MASS INDEX: 30.34 KG/M2 | TEMPERATURE: 97.1 F | OXYGEN SATURATION: 97 % | HEART RATE: 68 BPM | RESPIRATION RATE: 20 BRPM

## 2022-11-15 DIAGNOSIS — Z51.81 ENCOUNTER FOR MONITORING OF HYDROXYUREA THERAPY: ICD-10-CM

## 2022-11-15 DIAGNOSIS — D47.1 MYELOPROLIFERATIVE DISORDER (HCC): ICD-10-CM

## 2022-11-15 DIAGNOSIS — Z79.64 ENCOUNTER FOR MONITORING OF HYDROXYUREA THERAPY: ICD-10-CM

## 2022-11-15 DIAGNOSIS — Z15.89 JAK2 GENE MUTATION: Primary | ICD-10-CM

## 2022-11-15 DIAGNOSIS — D75.839 THROMBOCYTOSIS: ICD-10-CM

## 2022-11-15 LAB
ABSOLUTE IMMATURE GRANULOCYTE: 0.01 THOU/MM3 (ref 0–0.07)
BASINOPHIL, AUTOMATED: 0 % (ref 0–3)
BASOPHILS ABSOLUTE: 0 THOU/MM3 (ref 0–0.1)
EOSINOPHILS ABSOLUTE: 0.1 THOU/MM3 (ref 0–0.4)
EOSINOPHILS RELATIVE PERCENT: 3 % (ref 0–4)
HCT VFR BLD CALC: 42.5 % (ref 42–52)
HEMOGLOBIN: 14.3 GM/DL (ref 14–18)
IMMATURE GRANULOCYTES: 0 %
LYMPHOCYTES # BLD: 25 % (ref 15–47)
LYMPHOCYTES ABSOLUTE: 1.3 THOU/MM3 (ref 1–4.8)
MCH RBC QN AUTO: 35.8 PG (ref 26–33)
MCHC RBC AUTO-ENTMCNC: 33.6 GM/DL (ref 32.2–35.5)
MCV RBC AUTO: 106 FL (ref 80–94)
MONOCYTES ABSOLUTE: 0.5 THOU/MM3 (ref 0.4–1.3)
MONOCYTES: 9 % (ref 0–12)
PDW BLD-RTO: 14.4 % (ref 11.5–14.5)
PLATELET # BLD: 440 THOU/MM3 (ref 130–400)
PMV BLD AUTO: 9.6 FL (ref 9.4–12.4)
RBC # BLD: 4 MILL/MM3 (ref 4.7–6.1)
SEG NEUTROPHILS: 63 % (ref 43–75)
SEGMENTED NEUTROPHILS ABSOLUTE COUNT: 3.3 THOU/MM3 (ref 1.8–7.7)
WBC # BLD: 5.3 THOU/MM3 (ref 4.8–10.8)

## 2022-11-15 PROCEDURE — 1036F TOBACCO NON-USER: CPT | Performed by: NURSE PRACTITIONER

## 2022-11-15 PROCEDURE — 1123F ACP DISCUSS/DSCN MKR DOCD: CPT | Performed by: NURSE PRACTITIONER

## 2022-11-15 PROCEDURE — 99213 OFFICE O/P EST LOW 20 MIN: CPT | Performed by: NURSE PRACTITIONER

## 2022-11-15 PROCEDURE — 3017F COLORECTAL CA SCREEN DOC REV: CPT | Performed by: NURSE PRACTITIONER

## 2022-11-15 PROCEDURE — 99211 OFF/OP EST MAY X REQ PHY/QHP: CPT

## 2022-11-15 PROCEDURE — G8417 CALC BMI ABV UP PARAM F/U: HCPCS | Performed by: NURSE PRACTITIONER

## 2022-11-15 PROCEDURE — G8427 DOCREV CUR MEDS BY ELIG CLIN: HCPCS | Performed by: NURSE PRACTITIONER

## 2022-11-15 PROCEDURE — 85025 COMPLETE CBC W/AUTO DIFF WBC: CPT

## 2022-11-15 PROCEDURE — G8484 FLU IMMUNIZE NO ADMIN: HCPCS | Performed by: NURSE PRACTITIONER

## 2022-11-15 RX ORDER — HYDROXYUREA 500 MG/1
1000 CAPSULE ORAL DAILY
Qty: 60 CAPSULE | Refills: 1 | Status: SHIPPED | OUTPATIENT
Start: 2022-11-15 | End: 2023-01-14

## 2022-11-15 NOTE — PATIENT INSTRUCTIONS
Continue Hydrea 500 mg tablets two tablets by mouth daily  Repeat labs at Dr. Ev Kaiser office in 3 weeks  Return to clinic in 7 weeks for follow up and repeat labs  Notify the office of any new or worsening symptoms

## 2022-11-15 NOTE — PROGRESS NOTES
[facility]  [address] [place] [id number]  Dept: [phone]  Loc: [phone]       Visit Date:11/15/2022     French Boast is a 71 y.o. male who presents today for:   Chief Complaint   Patient presents with    Follow-up     Thrombocytosis        HPI:   French Boast is a 71 y.o. male with JAK2 positive myeloproliferative disorder with an elevated platelet count. He was first noted to have an elevated platelet count in December 2020. At that time his platelet count was 605,762. He had a similar result in March 2021. In June 2021 his platelet count was 027,249.      10/18/2022: The patient's platelet count increased to 642,000. He was on treatment with Hydrea 500 mg by mouth daily. The patient was instructed to increase his treatment to Hydrea 500 mg by mouth twice daily. Interval History 11/15/2022: The patient returns for follow-up on his JAK2 positive myeloproliferative disorder. CBC results reveal WBCs 5.3, Hgb 14.3, HCT 42.5%, , RDW 14.4% and platelet count improved to 440,000 from his previous value of 614,000. He denies any headaches, facial flushing or vision changes. No chest pain or SOB. He denies any abnormal bleeding; no epistaxis, gingival bleeding, hemoptysis, hematemesis, hematuria, hematochezia, melena. No fevers or night sweats. PMH, SH, and FH:  I reviewed the patients medication list and allergy list as noted on the electronic medical record. The PMH, SH and FH were also reviewed as noted on the EMR. Review of Systems:   Review of Systems   Pertinent review of systems noted in HPI, all other ROS negative.    Objective:   Physical Exam   BP (!) 149/81 (Site: Left Upper Arm, Position: Sitting, Cuff Size: Medium Adult)   Pulse 68   Temp 97.1 °F (36.2 °C) (Oral)   Resp 20   Ht 5' 8\" (1.727 m)   Wt 200 lb 3.2 oz (90.8 kg)   SpO2 97%   BMI 30.44 kg/m²    General appearance: No apparent distress, calm and cooperative. HEENT: Pupils equal, round, and reactive to light. Conjunctivae/corneas clear. Oral mucosa intact  Neck: Supple, with full range of motion. Trachea midline. Respiratory:  Normal respiratory effort. Clear to auscultation, bilaterally without Rales/Wheezes/Rhonchi. Cardiovascular: Regular rate and rhythm with normal S1/S2 without murmurs, rubs or gallops. Abdomen: Soft, non-tender, non-distended with active bowel sounds. Musculoskeletal: No clubbing, cyanosis or edema bilaterally. Skin: Skin color, texture, turgor normal.  No visible rashes or lesions. Neurologic:  Neurovascularly intact without any focal sensory/motor deficits. Psychiatric: Alert and oriented, thought content appropriate, normal insight  Capillary Refill: Brisk,< 3 seconds   Peripheral Pulses: +2 palpable, equal bilaterally       Imaging Studies and Labs:   CBC:   Lab Results   Component Value Date    WBC 5.3 11/15/2022    HGB 14.3 11/15/2022    HCT 42.5 11/15/2022     (H) 11/15/2022     (H) 11/15/2022     BMP:   Lab Results   Component Value Date/Time     07/19/2022 09:44 AM     11/29/2021 04:21 PM    K 4.0 07/19/2022 09:44 AM    K 3.9 11/29/2021 04:21 PM     11/29/2021 04:21 PM    CO2 28 11/29/2021 04:21 PM    BUN 19 11/29/2021 04:21 PM    CREATININE 1.1 07/19/2022 09:44 AM    CREATININE 0.9 11/29/2021 04:21 PM    GLUCOSE 92 11/29/2021 04:21 PM    CALCIUM 9.7 07/19/2022 09:43 AM      LFT:   Lab Results   Component Value Date    ALT 13 11/29/2021    AST 26 11/29/2021    ALKPHOS 64 11/29/2021    BILITOT 0.6 11/29/2021         Assessment and Plan:   1. Myeloproliferative disorder (HCC)  - hydroxyurea (HYDREA) 500 MG chemo capsule; Take 2 capsules by mouth daily  Dispense: 60 capsule; Refill: 1  - CBC with Auto Differential; Future    Return in about 7 weeks (around 1/3/2023). All patient questions answered. Pt voiced understanding.  Patient agreed with treatment

## 2022-12-13 DIAGNOSIS — D47.1 MYELOPROLIFERATIVE DISORDER (HCC): ICD-10-CM

## 2022-12-14 RX ORDER — HYDROXYUREA 500 MG/1
1000 CAPSULE ORAL DAILY
Qty: 60 CAPSULE | Refills: 1 | Status: SHIPPED | OUTPATIENT
Start: 2022-12-14 | End: 2023-02-12

## 2023-01-03 ENCOUNTER — OFFICE VISIT (OUTPATIENT)
Dept: ONCOLOGY | Age: 73
End: 2023-01-03
Payer: MEDICARE

## 2023-01-03 ENCOUNTER — HOSPITAL ENCOUNTER (OUTPATIENT)
Dept: INFUSION THERAPY | Age: 73
Discharge: HOME | End: 2023-01-03
Payer: MEDICARE

## 2023-01-03 VITALS
SYSTOLIC BLOOD PRESSURE: 106 MMHG | OXYGEN SATURATION: 95 % | TEMPERATURE: 98.5 F | HEIGHT: 68 IN | RESPIRATION RATE: 20 BRPM | DIASTOLIC BLOOD PRESSURE: 67 MMHG | HEART RATE: 66 BPM | WEIGHT: 198 LBS | BODY MASS INDEX: 30.01 KG/M2

## 2023-01-03 VITALS
WEIGHT: 198 LBS | RESPIRATION RATE: 20 BRPM | DIASTOLIC BLOOD PRESSURE: 67 MMHG | BODY MASS INDEX: 30.01 KG/M2 | OXYGEN SATURATION: 95 % | HEART RATE: 66 BPM | SYSTOLIC BLOOD PRESSURE: 106 MMHG | TEMPERATURE: 98.5 F | HEIGHT: 68 IN

## 2023-01-03 DIAGNOSIS — Z15.89 JAK2 GENE MUTATION: Primary | ICD-10-CM

## 2023-01-03 DIAGNOSIS — D47.1 MYELOPROLIFERATIVE DISORDER (HCC): ICD-10-CM

## 2023-01-03 LAB
ABSOLUTE IMMATURE GRANULOCYTE: 0.01 THOU/MM3 (ref 0–0.07)
BASINOPHIL, AUTOMATED: 0 % (ref 0–3)
BASOPHILS ABSOLUTE: 0 THOU/MM3 (ref 0–0.1)
EOSINOPHILS ABSOLUTE: 0.1 THOU/MM3 (ref 0–0.4)
EOSINOPHILS RELATIVE PERCENT: 2 % (ref 0–4)
HCT VFR BLD CALC: 40.2 % (ref 42–52)
HEMOGLOBIN: 13.8 GM/DL (ref 14–18)
IMMATURE GRANULOCYTES: 0 %
LYMPHOCYTES # BLD: 27 % (ref 15–47)
LYMPHOCYTES ABSOLUTE: 1.3 THOU/MM3 (ref 1–4.8)
MCH RBC QN AUTO: 37.2 PG (ref 26–33)
MCHC RBC AUTO-ENTMCNC: 34.3 GM/DL (ref 32.2–35.5)
MCV RBC AUTO: 108 FL (ref 80–94)
MONOCYTES ABSOLUTE: 0.5 THOU/MM3 (ref 0.4–1.3)
MONOCYTES: 9 % (ref 0–12)
PDW BLD-RTO: 15.5 % (ref 11.5–14.5)
PLATELET # BLD: 349 THOU/MM3 (ref 130–400)
PMV BLD AUTO: 9.4 FL (ref 9.4–12.4)
RBC # BLD: 3.71 MILL/MM3 (ref 4.7–6.1)
SEG NEUTROPHILS: 61 % (ref 43–75)
SEGMENTED NEUTROPHILS ABSOLUTE COUNT: 2.9 THOU/MM3 (ref 1.8–7.7)
WBC # BLD: 4.8 THOU/MM3 (ref 4.8–10.8)

## 2023-01-03 PROCEDURE — G8427 DOCREV CUR MEDS BY ELIG CLIN: HCPCS | Performed by: NURSE PRACTITIONER

## 2023-01-03 PROCEDURE — 3017F COLORECTAL CA SCREEN DOC REV: CPT | Performed by: NURSE PRACTITIONER

## 2023-01-03 PROCEDURE — 99211 OFF/OP EST MAY X REQ PHY/QHP: CPT

## 2023-01-03 PROCEDURE — 99214 OFFICE O/P EST MOD 30 MIN: CPT | Performed by: NURSE PRACTITIONER

## 2023-01-03 PROCEDURE — G8484 FLU IMMUNIZE NO ADMIN: HCPCS | Performed by: NURSE PRACTITIONER

## 2023-01-03 PROCEDURE — 1036F TOBACCO NON-USER: CPT | Performed by: NURSE PRACTITIONER

## 2023-01-03 PROCEDURE — G8417 CALC BMI ABV UP PARAM F/U: HCPCS | Performed by: NURSE PRACTITIONER

## 2023-01-03 PROCEDURE — 1123F ACP DISCUSS/DSCN MKR DOCD: CPT | Performed by: NURSE PRACTITIONER

## 2023-01-03 PROCEDURE — 85025 COMPLETE CBC W/AUTO DIFF WBC: CPT

## 2023-01-03 RX ORDER — HYDROXYUREA 500 MG/1
1000 CAPSULE ORAL DAILY
Qty: 180 CAPSULE | Refills: 1 | Status: SHIPPED | OUTPATIENT
Start: 2023-01-03 | End: 2023-07-02

## 2023-01-03 NOTE — PROGRESS NOTES
[address] [address] [address]  Dept: [phone]  Loc: [phone]       Visit Date:1/3/2023     Yumiko Bauman is a 71 y.o. male who presents today for:   Chief Complaint   Patient presents with    Follow-up     JAK2 gene mutation        HPI:   Yumiko Bauman is a 71 y.o. male with JAK2 positive myeloproliferative disorder with an elevated platelet count. He was first noted to have an elevated platelet count in December 2020. At that time his platelet count was 083,687. He had a similar result in March 2021. In June 2021 his platelet count was 581,933.      10/18/2022: The patient's platelet count increased to 642,000. He was on treatment with Hydrea 500 mg by mouth daily. The patient was instructed to increase his treatment to Hydrea 500 mg by mouth twice daily. 11/15/2022: The patient' CBC results revealed WBCs 5.3, Hgb 14.3, HCT 42.5%, , RDW 14.4% and platelet count improved to 440,000 from his previous value of 614,000. Interval History 1/3/2023: The patient returns for follow-up on his JAK2 positive myeloproliferative disorder. CBC results reveal WBCs 4.8, Hgb 13.8, HCT 40.2%, , RDW 15.5% and platelet count 974,261. Patient denies any headaches, dizziness or vision changes. No chest pain or SOB. He is currently on treatment with Hydrea 500 mg 2 tablets by mouth daily. He denies any abnormal bleeding; no epistaxis, gingival bleeding, hemoptysis, hematemesis, hematuria, hematochezia, melena. No fevers or night sweats. PMH, SH, and FH:  I reviewed the patients medication list and allergy list as noted on the electronic medical record. The PMH, SH and FH were also reviewed as noted on the EMR. Review of Systems:   Review of Systems   Pertinent review of systems noted in HPI, all other ROS negative.    Objective:   Physical Exam   /67 (Site: Right Upper Arm, Position: Sitting, Cuff Size: Medium Adult)   Pulse 66   Temp 98.5 °F (36.9 °C) (Oral)   Resp 20   Ht 5' 8\" (1.727 m)   Wt 198 lb (89.8 kg)   SpO2 95%   BMI 30.11 kg/m²    General appearance: No apparent distress, calm and cooperative. HEENT: Pupils equal, round, and reactive to light. Conjunctivae/corneas clear. Oral mucosa intact  Neck: Supple, with full range of motion. Trachea midline. Respiratory:  Normal respiratory effort. Clear to auscultation, bilaterally without Rales/Wheezes/Rhonchi. Cardiovascular: Regular rate and rhythm with normal S1/S2 without murmurs, rubs or gallops. Abdomen: Soft, non-tender, non-distended with active bowel sounds. Musculoskeletal: No clubbing, cyanosis or edema bilaterally. Skin: Skin color, texture, turgor normal.  No visible rashes or lesions. Neurologic:  Neurovascularly intact without any focal sensory/motor deficits. Psychiatric: Alert and oriented, thought content appropriate, normal insight  Capillary Refill: Brisk,< 3 seconds   Peripheral Pulses: +2 palpable, equal bilaterally       Imaging Studies and Labs:   CBC:   Lab Results   Component Value Date    WBC 4.8 01/03/2023    HGB 13.8 (L) 01/03/2023    HCT 40.2 (L) 01/03/2023     (H) 01/03/2023     01/03/2023     BMP:   Lab Results   Component Value Date/Time     07/19/2022 09:44 AM     11/29/2021 04:21 PM    K 4.0 07/19/2022 09:44 AM    K 3.9 11/29/2021 04:21 PM     11/29/2021 04:21 PM    CO2 28 11/29/2021 04:21 PM    BUN 19 11/29/2021 04:21 PM    CREATININE 1.1 07/19/2022 09:44 AM    CREATININE 0.9 11/29/2021 04:21 PM    GLUCOSE 92 11/29/2021 04:21 PM    CALCIUM 9.7 07/19/2022 09:43 AM      LFT:   Lab Results   Component Value Date    ALT 13 11/29/2021    AST 26 11/29/2021    ALKPHOS 64 11/29/2021    BILITOT 0.6 11/29/2021         Assessment and Plan:   1. Myeloproliferative disorder (HCC)  - hydroxyurea (HYDREA) 500 MG chemo capsule;  Take 2 capsules by mouth daily Dispense: 180 capsule; Refill: 1  - CBC with Auto Differential; Future  - Hepatic Function Panel; Future  - POC PANEL BMP W/IOCA; Future    2. JAK2 gene mutation    Return in about 3 months (around 4/3/2023). All patient questions answered. Pt voiced understanding. Patient agreed with treatment plan. Follow up as directed. Patient instructed to call for questions or concerns.        Electronically signed by   JOSE C Curiel CNP

## 2023-01-03 NOTE — PATIENT INSTRUCTIONS
Continue Hydrea 2 tablets daily  Return to clinic in 3 months for follow up and labs  Notify the office of any new or worsening symptoms

## 2023-04-03 ENCOUNTER — HOSPITAL ENCOUNTER (OUTPATIENT)
Dept: INFUSION THERAPY | Age: 73
Discharge: HOME | End: 2023-04-03
Payer: MEDICARE

## 2023-04-03 ENCOUNTER — OFFICE VISIT (OUTPATIENT)
Dept: ONCOLOGY | Age: 73
End: 2023-04-03
Payer: MEDICARE

## 2023-04-03 VITALS
TEMPERATURE: 98.1 F | HEIGHT: 68 IN | BODY MASS INDEX: 30.16 KG/M2 | OXYGEN SATURATION: 98 % | DIASTOLIC BLOOD PRESSURE: 70 MMHG | WEIGHT: 199 LBS | HEART RATE: 86 BPM | RESPIRATION RATE: 20 BRPM | SYSTOLIC BLOOD PRESSURE: 123 MMHG

## 2023-04-03 VITALS
BODY MASS INDEX: 30.16 KG/M2 | TEMPERATURE: 98.1 F | OXYGEN SATURATION: 98 % | SYSTOLIC BLOOD PRESSURE: 123 MMHG | RESPIRATION RATE: 20 BRPM | HEIGHT: 68 IN | WEIGHT: 199 LBS | DIASTOLIC BLOOD PRESSURE: 70 MMHG | HEART RATE: 86 BPM

## 2023-04-03 DIAGNOSIS — Z51.81 ENCOUNTER FOR MONITORING OF HYDROXYUREA THERAPY: ICD-10-CM

## 2023-04-03 DIAGNOSIS — Z15.89 JAK2 GENE MUTATION: ICD-10-CM

## 2023-04-03 DIAGNOSIS — D47.1 MYELOPROLIFERATIVE DISORDER (HCC): Primary | ICD-10-CM

## 2023-04-03 DIAGNOSIS — Z79.64 ENCOUNTER FOR MONITORING OF HYDROXYUREA THERAPY: ICD-10-CM

## 2023-04-03 DIAGNOSIS — D47.1 MYELOPROLIFERATIVE DISORDER (HCC): ICD-10-CM

## 2023-04-03 LAB
ALBUMIN SERPL BCG-MCNC: 4.2 G/DL (ref 3.5–5.1)
ALP SERPL-CCNC: 73 U/L (ref 38–126)
ALT SERPL W/O P-5'-P-CCNC: 15 U/L (ref 11–66)
AST SERPL-CCNC: 27 U/L (ref 5–40)
BILIRUB CONJ SERPL-MCNC: < 0.2 MG/DL (ref 0–0.3)
BILIRUB SERPL-MCNC: 0.3 MG/DL (ref 0.3–1.2)
BUN BLDP-MCNC: 30 MG/DL (ref 8–26)
CHLORIDE BLD-SCNC: 102 MEQ/L (ref 98–109)
CREAT BLD-MCNC: 1 MG/DL (ref 0.5–1.2)
GFR SERPL CREATININE-BSD FRML MDRD: > 60 ML/MIN/1.73M2
GLUCOSE BLD-MCNC: 102 MG/DL (ref 70–108)
IONIZED CALCIUM, WHOLE BLOOD: 1.16 MMOL/L (ref 1.12–1.32)
POTASSIUM BLD-SCNC: 4.2 MEQ/L (ref 3.5–4.9)
PROT SERPL-MCNC: 6.7 G/DL (ref 6.1–8)
SODIUM BLD-SCNC: 142 MEQ/L (ref 138–146)
TOTAL CO2, WHOLE BLOOD: 28 MEQ/L (ref 23–33)

## 2023-04-03 PROCEDURE — 1123F ACP DISCUSS/DSCN MKR DOCD: CPT | Performed by: NURSE PRACTITIONER

## 2023-04-03 PROCEDURE — G8417 CALC BMI ABV UP PARAM F/U: HCPCS | Performed by: NURSE PRACTITIONER

## 2023-04-03 PROCEDURE — 80076 HEPATIC FUNCTION PANEL: CPT

## 2023-04-03 PROCEDURE — 80047 BASIC METABLC PNL IONIZED CA: CPT

## 2023-04-03 PROCEDURE — 1036F TOBACCO NON-USER: CPT | Performed by: NURSE PRACTITIONER

## 2023-04-03 PROCEDURE — G8427 DOCREV CUR MEDS BY ELIG CLIN: HCPCS | Performed by: NURSE PRACTITIONER

## 2023-04-03 PROCEDURE — 3017F COLORECTAL CA SCREEN DOC REV: CPT | Performed by: NURSE PRACTITIONER

## 2023-04-03 PROCEDURE — 99211 OFF/OP EST MAY X REQ PHY/QHP: CPT

## 2023-04-03 PROCEDURE — 85025 COMPLETE CBC W/AUTO DIFF WBC: CPT

## 2023-04-03 PROCEDURE — 99214 OFFICE O/P EST MOD 30 MIN: CPT | Performed by: NURSE PRACTITIONER

## 2023-04-03 RX ORDER — FINASTERIDE 5 MG/1
5 TABLET, FILM COATED ORAL DAILY
COMMUNITY

## 2023-04-03 RX ORDER — OMEGA-3/DHA/EPA/FISH OIL 120-180 MG
CAPSULE ORAL
COMMUNITY

## 2023-04-03 NOTE — PROGRESS NOTES
[address] [address]  Dept: [phone]  Loc: [phone]       Visit Date:4/3/2023     Merle Day is a 71 y.o. male who presents today for:   Chief Complaint   Patient presents with    Follow-up     JAK2 gene mutation        HPI:   Merle Day is a 71 y.o. male with JAK2 positive myeloproliferative disorder with an elevated platelet count. He was first noted to have an elevated platelet count in December 2020. At that time his platelet count was 855,224. He had a similar result in March 2021. In June 2021 his platelet count was 759,880.      10/18/2022: The patient's platelet count increased to 642,000. He was on treatment with Hydrea 500 mg by mouth daily. The patient was instructed to increase his treatment to Hydrea 500 mg by mouth twice daily. 11/15/2022: The patient' CBC results revealed WBCs 5.3, Hgb 14.3, HCT 42.5%, , RDW 14.4% and platelet count improved to 440,000 from his previous value of 614,000.     01/03/2023 CBC results revealed WBCs 4.8, Hgb 13.8, HCT 40.2%, , RDW 15.5% and platelet count 293,984. He was continued on treatment with Hydrea 500 mg by mouth twice daily. Interval History 4/3/2023: The patient returns for follow-up on his JAK2 positive myeloproliferative disorder. He is currently on treatment with Hydrea 500 mg 2 tablets by mouth daily. CBC results reveal WBCs 5.9, Hgb 14.6, HCT 40.9% and platelet count 596,600. He denies any abnormal bleeding; no epistaxis, gingival bleeding, hemoptysis, hematemesis, hematuria, hematochezia, melena. No headaches, dizziness or vision changes. No chest pain or SOB. No abdominal pain or GI issues. No fevers or night sweats. PMH, SH, and FH:  I reviewed the patients medication list and allergy list as noted on the electronic medical record.  The PMH, SH and FH were also reviewed as noted on the

## 2023-04-03 NOTE — PATIENT INSTRUCTIONS
Continue Hydrea 2 tablets daily  Return to clinic for follow up in 3 months  Notify the office of any new or worsening symptoms

## 2023-04-04 LAB
BASOPHILS # BLD AUTO: 0 THOU/MM3 (ref 0–0.1)
BASOPHILS NFR BLD AUTO: 0 % (ref 0–3)
EOSINOPHIL # BLD AUTO: 0.1 THOU/MM3 (ref 0–0.4)
EOSINOPHIL NFR BLD AUTO: 2 % (ref 0–4)
ERYTHROCYTE [DISTWIDTH] IN BLOOD BY AUTOMATED COUNT: 12.3 % (ref 11.5–14.5)
HCT VFR BLD AUTO: 40.9 % (ref 42–52)
HGB BLD-MCNC: 14.6 GM/DL (ref 14–18)
IMM GRANULOCYTES # BLD AUTO: 0.01 THOU/MM3 (ref 0–0.07)
IMM GRANULOCYTES NFR BLD AUTO: 0 %
LYMPHOCYTES # BLD AUTO: 0.7 THOU/MM3 (ref 1–4.8)
LYMPHOCYTES NFR BLD AUTO: 12 % (ref 15–47)
MCH RBC QN AUTO: 40.7 PG (ref 26–33)
MCHC RBC AUTO-ENTMCNC: 35.7 GM/DL (ref 32.2–35.5)
MCV RBC AUTO: 114 FL (ref 80–94)
MONOCYTES # BLD AUTO: 0.5 THOU/MM3 (ref 0.4–1.3)
MONOCYTES NFR BLD AUTO: 9 % (ref 0–12)
NEUTROPHILS NFR BLD AUTO: 77 % (ref 43–75)
PATHOLOGIST REVIEW: ABNORMAL
PLATELET # BLD AUTO: 234 THOU/MM3 (ref 130–400)
PMV BLD AUTO: 10 FL (ref 9.4–12.4)
RBC # BLD AUTO: 3.59 MILL/MM3 (ref 4.7–6.1)
SEGMENTED NEUTROPHILS ABSOLUTE COUNT: 4.5 THOU/MM3 (ref 1.8–7.7)
WBC # BLD AUTO: 5.9 THOU/MM3 (ref 4.8–10.8)

## 2023-07-11 ENCOUNTER — HOSPITAL ENCOUNTER (OUTPATIENT)
Dept: INFUSION THERAPY | Age: 73
Discharge: HOME | End: 2023-07-11
Payer: MEDICARE

## 2023-07-11 ENCOUNTER — OFFICE VISIT (OUTPATIENT)
Dept: ONCOLOGY | Age: 73
End: 2023-07-11

## 2023-07-11 VITALS
RESPIRATION RATE: 20 BRPM | DIASTOLIC BLOOD PRESSURE: 66 MMHG | SYSTOLIC BLOOD PRESSURE: 112 MMHG | HEART RATE: 71 BPM | HEIGHT: 68 IN | BODY MASS INDEX: 29.58 KG/M2 | WEIGHT: 195.2 LBS | TEMPERATURE: 98 F | OXYGEN SATURATION: 98 %

## 2023-07-11 VITALS
HEART RATE: 71 BPM | OXYGEN SATURATION: 98 % | WEIGHT: 195.2 LBS | BODY MASS INDEX: 29.58 KG/M2 | SYSTOLIC BLOOD PRESSURE: 112 MMHG | TEMPERATURE: 98 F | RESPIRATION RATE: 20 BRPM | DIASTOLIC BLOOD PRESSURE: 66 MMHG | HEIGHT: 68 IN

## 2023-07-11 DIAGNOSIS — Z51.81 ENCOUNTER FOR MONITORING OF HYDROXYUREA THERAPY: ICD-10-CM

## 2023-07-11 DIAGNOSIS — D47.1 MYELOPROLIFERATIVE DISORDER (HCC): Primary | ICD-10-CM

## 2023-07-11 DIAGNOSIS — D47.1 MYELOPROLIFERATIVE DISORDER (HCC): ICD-10-CM

## 2023-07-11 DIAGNOSIS — Z79.64 ENCOUNTER FOR MONITORING OF HYDROXYUREA THERAPY: ICD-10-CM

## 2023-07-11 LAB
ALBUMIN SERPL BCG-MCNC: 4.2 G/DL (ref 3.5–5.1)
ALP SERPL-CCNC: 67 U/L (ref 38–126)
ALT SERPL W/O P-5'-P-CCNC: 12 U/L (ref 11–66)
AST SERPL-CCNC: 27 U/L (ref 5–40)
BASOPHILS # BLD AUTO: 0 THOU/MM3 (ref 0–0.1)
BASOPHILS NFR BLD AUTO: 0 % (ref 0–3)
BILIRUB CONJ SERPL-MCNC: < 0.2 MG/DL (ref 0–0.3)
BILIRUB SERPL-MCNC: 0.4 MG/DL (ref 0.3–1.2)
BUN BLDP-MCNC: 28 MG/DL (ref 8–26)
CHLORIDE BLD-SCNC: 102 MEQ/L (ref 98–109)
CREAT BLD-MCNC: 0.8 MG/DL (ref 0.5–1.2)
EOSINOPHIL # BLD AUTO: 0.1 THOU/MM3 (ref 0–0.4)
EOSINOPHIL NFR BLD AUTO: 1 % (ref 0–4)
ERYTHROCYTE [DISTWIDTH] IN BLOOD BY AUTOMATED COUNT: 11.8 % (ref 11.5–14.5)
GFR SERPL CREATININE-BSD FRML MDRD: > 60 ML/MIN/1.73M2
GLUCOSE BLD-MCNC: 90 MG/DL (ref 70–108)
HCT VFR BLD AUTO: 40.9 % (ref 42–52)
HGB BLD-MCNC: 14.5 GM/DL (ref 14–18)
IMM GRANULOCYTES # BLD AUTO: 0.01 THOU/MM3 (ref 0–0.07)
IMM GRANULOCYTES NFR BLD AUTO: 0 %
IONIZED CALCIUM, WHOLE BLOOD: 1.18 MMOL/L (ref 1.12–1.32)
LYMPHOCYTES # BLD AUTO: 1 THOU/MM3 (ref 1–4.8)
LYMPHOCYTES NFR BLD AUTO: 20 % (ref 15–47)
MCH RBC QN AUTO: 41.2 PG (ref 26–33)
MCHC RBC AUTO-ENTMCNC: 35.5 GM/DL (ref 32.2–35.5)
MCV RBC AUTO: 116 FL (ref 80–94)
MONOCYTES # BLD AUTO: 0.4 THOU/MM3 (ref 0.4–1.3)
MONOCYTES NFR BLD AUTO: 8 % (ref 0–12)
NEUTROPHILS NFR BLD AUTO: 70 % (ref 43–75)
PLATELET # BLD AUTO: 284 THOU/MM3 (ref 130–400)
PMV BLD AUTO: 9.7 FL (ref 9.4–12.4)
POTASSIUM BLD-SCNC: 3.9 MEQ/L (ref 3.5–4.9)
PROT SERPL-MCNC: 6.5 G/DL (ref 6.1–8)
RBC # BLD AUTO: 3.52 MILL/MM3 (ref 4.7–6.1)
SEGMENTED NEUTROPHILS ABSOLUTE COUNT: 3.5 THOU/MM3 (ref 1.8–7.7)
SODIUM BLD-SCNC: 143 MEQ/L (ref 138–146)
TOTAL CO2, WHOLE BLOOD: 27 MEQ/L (ref 23–33)
WBC # BLD AUTO: 5 THOU/MM3 (ref 4.8–10.8)

## 2023-07-11 PROCEDURE — 80076 HEPATIC FUNCTION PANEL: CPT

## 2023-07-11 PROCEDURE — 80047 BASIC METABLC PNL IONIZED CA: CPT

## 2023-07-11 PROCEDURE — 99211 OFF/OP EST MAY X REQ PHY/QHP: CPT

## 2023-07-11 PROCEDURE — 85025 COMPLETE CBC W/AUTO DIFF WBC: CPT

## 2023-07-11 RX ORDER — HYDROXYUREA 500 MG/1
CAPSULE ORAL 2 TIMES DAILY
COMMUNITY
End: 2023-07-11 | Stop reason: SDUPTHER

## 2023-07-11 RX ORDER — HYDROXYUREA 500 MG/1
500 CAPSULE ORAL 2 TIMES DAILY
Qty: 60 CAPSULE | Refills: 5 | Status: SHIPPED | OUTPATIENT
Start: 2023-07-11 | End: 2024-01-07

## 2023-07-11 NOTE — PATIENT INSTRUCTIONS
Continue Hydrea 2 daily  Return to clinic for follow up in 6 months  Notify the office of any new or worsening symptoms

## 2023-07-11 NOTE — PROGRESS NOTES
[address] [facility] [address]  Dept: [phone]  Loc: [phone]       Visit Date:7/11/2023     Alexia Carrasco is a 71 y.o. male who presents today for:   Chief Complaint   Patient presents with    Follow-up     Myeloproliferative disorder ([address] [facility])        HPI:   Alexia Carrasco is a 71 y.o. male with JAK2 positive myeloproliferative disorder with an elevated platelet count. He was first noted to have an elevated platelet count in December 2020. At that time his platelet count was 980,611. He had a similar result in March 2021. In June 2021 his platelet count was 466,377.       10/18/2022: The patient's platelet count increased to 642,000. He was on treatment with Hydrea 500 mg by mouth daily. The patient was instructed to increase his treatment to Hydrea 500 mg by mouth twice daily. 11/15/2022: The patient' CBC results revealed WBCs 5.3, Hgb 14.3, HCT 42.5%, , RDW 14.4% and platelet count improved to 440,000 from his previous value of 614,000.      01/03/2023 CBC results revealed WBCs 4.8, Hgb 13.8, HCT 40.2%, , RDW 15.5% and platelet count 671,277. He was continued on treatment with Hydrea 500 mg by mouth twice daily. 04/3/2023 CBC results reveal WBCs 5.9, Hgb 14.6, HCT 40.9% and platelet count 625,831. Interval History 7/11/2023: The patient returns for follow-up on his JAK2 positive myeloproliferative disorder. He is currently on treatment with Hydrea 500 mg 2 tablets by mouth daily. Follows with Dr. Rissa Centeno regarding his Methotrexate dosing, currently on treatment with 2.5 mg tablet twice weekly. He denies any headaches, dizziness or vision changes. No chest pain or SOB. He denies any abnormal bleeding; no epistaxis, gingival bleeding, hemoptysis, hematemesis, hematuria, hematochezia, melena. No abdominal pain or GI issues. No fevers or night sweats.         PMH, SH, and

## 2023-12-13 DIAGNOSIS — Z51.81 ENCOUNTER FOR MONITORING OF HYDROXYUREA THERAPY: ICD-10-CM

## 2023-12-13 DIAGNOSIS — Z79.64 ENCOUNTER FOR MONITORING OF HYDROXYUREA THERAPY: ICD-10-CM

## 2023-12-13 RX ORDER — HYDROXYUREA 500 MG/1
500 CAPSULE ORAL 2 TIMES DAILY
Qty: 60 CAPSULE | Refills: 5 | Status: SHIPPED | OUTPATIENT
Start: 2023-12-13 | End: 2024-06-10

## 2024-01-09 ENCOUNTER — HOSPITAL ENCOUNTER (OUTPATIENT)
Dept: INFUSION THERAPY | Age: 74
Discharge: HOME | End: 2024-01-09
Payer: MEDICARE

## 2024-01-09 ENCOUNTER — OFFICE VISIT (OUTPATIENT)
Dept: ONCOLOGY | Age: 74
End: 2024-01-09
Payer: MEDICARE

## 2024-01-09 VITALS
BODY MASS INDEX: 30.62 KG/M2 | HEART RATE: 75 BPM | RESPIRATION RATE: 20 BRPM | DIASTOLIC BLOOD PRESSURE: 72 MMHG | HEIGHT: 68 IN | SYSTOLIC BLOOD PRESSURE: 125 MMHG | WEIGHT: 202 LBS | OXYGEN SATURATION: 95 %

## 2024-01-09 VITALS
DIASTOLIC BLOOD PRESSURE: 72 MMHG | OXYGEN SATURATION: 95 % | BODY MASS INDEX: 30.62 KG/M2 | HEART RATE: 75 BPM | SYSTOLIC BLOOD PRESSURE: 125 MMHG | HEIGHT: 68 IN | WEIGHT: 202 LBS | RESPIRATION RATE: 20 BRPM

## 2024-01-09 DIAGNOSIS — D47.1 MYELOPROLIFERATIVE DISORDER (HCC): Primary | ICD-10-CM

## 2024-01-09 DIAGNOSIS — Z51.81 ENCOUNTER FOR MONITORING OF HYDROXYUREA THERAPY: ICD-10-CM

## 2024-01-09 DIAGNOSIS — Z79.64 ENCOUNTER FOR MONITORING OF HYDROXYUREA THERAPY: ICD-10-CM

## 2024-01-09 PROCEDURE — 1036F TOBACCO NON-USER: CPT | Performed by: NURSE PRACTITIONER

## 2024-01-09 PROCEDURE — G8484 FLU IMMUNIZE NO ADMIN: HCPCS | Performed by: NURSE PRACTITIONER

## 2024-01-09 PROCEDURE — 99214 OFFICE O/P EST MOD 30 MIN: CPT | Performed by: NURSE PRACTITIONER

## 2024-01-09 PROCEDURE — 3017F COLORECTAL CA SCREEN DOC REV: CPT | Performed by: NURSE PRACTITIONER

## 2024-01-09 PROCEDURE — G8427 DOCREV CUR MEDS BY ELIG CLIN: HCPCS | Performed by: NURSE PRACTITIONER

## 2024-01-09 PROCEDURE — 99211 OFF/OP EST MAY X REQ PHY/QHP: CPT

## 2024-01-09 PROCEDURE — G8417 CALC BMI ABV UP PARAM F/U: HCPCS | Performed by: NURSE PRACTITIONER

## 2024-01-09 PROCEDURE — 1123F ACP DISCUSS/DSCN MKR DOCD: CPT | Performed by: NURSE PRACTITIONER

## 2024-01-09 RX ORDER — HYDROXYUREA 500 MG/1
500 CAPSULE ORAL 2 TIMES DAILY
Qty: 60 CAPSULE | Refills: 5 | Status: SHIPPED | OUTPATIENT
Start: 2024-01-09 | End: 2024-07-07

## 2024-01-09 NOTE — PROGRESS NOTES
11/29/2021 04:21 PM    CALCIUM 9.7 07/19/2022 09:43 AM      LFT:   Lab Results   Component Value Date    ALT 12 07/11/2023    AST 27 07/11/2023    ALKPHOS 67 07/11/2023    BILITOT 0.4 07/11/2023         Assessment and Plan:   1. Myeloproliferative disorder (HCC)  JAK2 positive myeloproliferative disorder with an elevated platelet count    01/08/2024 WBC 3.8, Hgb 14.4, HCT 40.8% and platelet count 262,000.    - CBC with Auto Differential; Future  - Hepatic Function Panel; Future  - POC PANEL BMP W/IOCA; Future    2. Encounter for monitoring of hydroxyurea therapy  Continue treatment with Hydrea 500 mg by mouth twice daily    - hydroxyurea (HYDREA) 500 MG chemo capsule; Take 1 capsule by mouth 2 times daily  Dispense: 60 capsule; Refill: 5      Return in about 6 months (around 7/9/2024).       All patient questions answered. Pt voiced understanding. Patient agreed with treatment plan. Follow up as directed. Patient instructed to call for questions or concerns.       Electronically signed by   JOSE C Galeano - ANGEL

## 2024-07-30 ENCOUNTER — HOSPITAL ENCOUNTER (OUTPATIENT)
Dept: INFUSION THERAPY | Age: 74
Discharge: HOME | End: 2024-07-30
Payer: MEDICARE

## 2024-07-30 ENCOUNTER — OFFICE VISIT (OUTPATIENT)
Dept: ONCOLOGY | Age: 74
End: 2024-07-30
Payer: MEDICARE

## 2024-07-30 VITALS
SYSTOLIC BLOOD PRESSURE: 138 MMHG | HEART RATE: 61 BPM | DIASTOLIC BLOOD PRESSURE: 74 MMHG | OXYGEN SATURATION: 95 % | RESPIRATION RATE: 16 BRPM | TEMPERATURE: 97.7 F

## 2024-07-30 VITALS
HEIGHT: 68 IN | BODY MASS INDEX: 29.8 KG/M2 | SYSTOLIC BLOOD PRESSURE: 138 MMHG | DIASTOLIC BLOOD PRESSURE: 74 MMHG | TEMPERATURE: 97.7 F | RESPIRATION RATE: 16 BRPM | HEART RATE: 61 BPM | WEIGHT: 196.6 LBS | OXYGEN SATURATION: 95 %

## 2024-07-30 DIAGNOSIS — Z79.64 ENCOUNTER FOR MONITORING OF HYDROXYUREA THERAPY: ICD-10-CM

## 2024-07-30 DIAGNOSIS — D47.1 MYELOPROLIFERATIVE DISORDER (HCC): Primary | ICD-10-CM

## 2024-07-30 DIAGNOSIS — Z51.81 ENCOUNTER FOR MONITORING OF HYDROXYUREA THERAPY: ICD-10-CM

## 2024-07-30 LAB
ALBUMIN SERPL BCG-MCNC: 4.4 G/DL (ref 3.5–5.1)
ALP SERPL-CCNC: 68 U/L (ref 38–126)
ALT SERPL W/O P-5'-P-CCNC: 12 U/L (ref 11–66)
AST SERPL-CCNC: 25 U/L (ref 5–40)
BASOPHILS # BLD AUTO: 0 THOU/MM3 (ref 0–0.1)
BASOPHILS NFR BLD AUTO: 1 % (ref 0–3)
BILIRUB CONJ SERPL-MCNC: 0.2 MG/DL (ref 0.1–13.8)
BILIRUB SERPL-MCNC: 0.6 MG/DL (ref 0.3–1.2)
BUN BLDP-MCNC: 22 MG/DL (ref 8–26)
CHLORIDE BLD-SCNC: 107 MEQ/L (ref 98–109)
CREAT BLD-MCNC: 0.8 MG/DL (ref 0.5–1.2)
EOSINOPHIL # BLD AUTO: 0.1 THOU/MM3 (ref 0–0.4)
EOSINOPHIL NFR BLD AUTO: 2 % (ref 0–4)
ERYTHROCYTE [DISTWIDTH] IN BLOOD BY AUTOMATED COUNT: 12 % (ref 11.5–14.5)
GFR SERPL CREATININE-BSD FRML MDRD: > 90 ML/MIN/1.73M2
GLUCOSE BLD-MCNC: 96 MG/DL (ref 70–108)
HCT VFR BLD AUTO: 41.2 % (ref 42–52)
HGB BLD-MCNC: 14.3 GM/DL (ref 14–18)
IMM GRANULOCYTES # BLD AUTO: 0.02 THOU/MM3 (ref 0–0.07)
IMM GRANULOCYTES NFR BLD AUTO: 1 %
IONIZED CALCIUM, WHOLE BLOOD: 1.23 MMOL/L (ref 1.12–1.32)
LYMPHOCYTES # BLD AUTO: 1 THOU/MM3 (ref 1–4.8)
LYMPHOCYTES NFR BLD AUTO: 25 % (ref 15–47)
MCH RBC QN AUTO: 40.7 PG (ref 26–33)
MCHC RBC AUTO-ENTMCNC: 34.7 GM/DL (ref 32.2–35.5)
MCV RBC AUTO: 117 FL (ref 80–94)
MONOCYTES # BLD AUTO: 0.4 THOU/MM3 (ref 0.4–1.3)
MONOCYTES NFR BLD AUTO: 10 % (ref 0–12)
NEUTROPHILS ABSOLUTE: 2.6 THOU/MM3 (ref 1.8–7.7)
NEUTROPHILS NFR BLD AUTO: 63 % (ref 43–75)
PATHOLOGIST REVIEW: ABNORMAL
PLATELET # BLD AUTO: 235 THOU/MM3 (ref 130–400)
PMV BLD AUTO: 10.9 FL (ref 9.4–12.4)
POTASSIUM BLD-SCNC: 4.3 MEQ/L (ref 3.5–4.9)
PROT SERPL-MCNC: 6.5 G/DL (ref 6.1–8)
RBC # BLD AUTO: 3.51 MILL/MM3 (ref 4.7–6.1)
SODIUM BLD-SCNC: 145 MEQ/L (ref 138–146)
TOTAL CO2, WHOLE BLOOD: 31 MEQ/L (ref 23–33)
WBC # BLD AUTO: 4.1 THOU/MM3 (ref 4.8–10.8)

## 2024-07-30 PROCEDURE — G8427 DOCREV CUR MEDS BY ELIG CLIN: HCPCS | Performed by: NURSE PRACTITIONER

## 2024-07-30 PROCEDURE — 3017F COLORECTAL CA SCREEN DOC REV: CPT | Performed by: NURSE PRACTITIONER

## 2024-07-30 PROCEDURE — G8417 CALC BMI ABV UP PARAM F/U: HCPCS | Performed by: NURSE PRACTITIONER

## 2024-07-30 PROCEDURE — 1123F ACP DISCUSS/DSCN MKR DOCD: CPT | Performed by: NURSE PRACTITIONER

## 2024-07-30 PROCEDURE — 85025 COMPLETE CBC W/AUTO DIFF WBC: CPT

## 2024-07-30 PROCEDURE — 80076 HEPATIC FUNCTION PANEL: CPT

## 2024-07-30 PROCEDURE — 99214 OFFICE O/P EST MOD 30 MIN: CPT | Performed by: NURSE PRACTITIONER

## 2024-07-30 PROCEDURE — 80047 BASIC METABLC PNL IONIZED CA: CPT

## 2024-07-30 PROCEDURE — 99211 OFF/OP EST MAY X REQ PHY/QHP: CPT

## 2024-07-30 PROCEDURE — 1036F TOBACCO NON-USER: CPT | Performed by: NURSE PRACTITIONER

## 2024-07-30 RX ORDER — HYDROXYUREA 500 MG/1
1 CAPSULE ORAL 2 TIMES DAILY
COMMUNITY

## 2024-07-30 RX ORDER — ENALAPRIL MALEATE 10 MG/1
10 TABLET ORAL DAILY
COMMUNITY

## 2024-07-30 NOTE — PROGRESS NOTES
[facility] [place] [facility]  [facility]  [address]  [address]  [facility] [address]  Dept: [phone]  Loc: [phone]       Visit Date:7/30/2024     Lion Kaminski is a 73 y.o. male who presents today for:   Chief Complaint   Patient presents with    Follow-up     Myeloproliferative disorder        HPI:   Lion Kaminski is a 73 y.o. male with JAK2 positive myeloproliferative disorder with an elevated platelet count.  He was first noted to have an elevated platelet count in December 2020.  At that time his platelet count was 555,000.  He had a similar result in March 2021.  In June 2021 his platelet count was 762,000.       10/18/2022: The patient's platelet count increased to 642,000.  He was on treatment with Hydrea 500 mg by mouth daily.  The patient was instructed to increase his treatment to Hydrea 500 mg by mouth twice daily.        11/15/2022: The patient' CBC results revealed WBCs 5.3, Hgb 14.3, HCT 42.5%, , RDW 14.4% and platelet count improved to 440,000 from his previous value of 614,000.      01/03/2023 CBC results revealed WBCs 4.8, Hgb 13.8, HCT 40.2%, , RDW 15.5% and platelet count 349,000.  He was continued on treatment with Hydrea 500 mg by mouth twice daily.     04/3/2023 CBC results reveal WBCs 5.9, Hgb 14.6, HCT 40.9% and platelet count 234,000.       01/08/2024 WBC 3.8, Hgb 14.4, HCT 40.8% and platelet count 262,000.     01/09/2024 GFR and BMP results within normal limits.  WBC 4.1, Hgb 14.3, HCT 41.2%, , RDW 12% and platelet count 235,000.       07/23/2024 the patient was seen for follow-up evaluation with Dr. Perez.  He is currently on treatment with methotrexate 2.5 mg, 2 tablets by mouth once weekly, taking on Sunday (previously on treatment with 3 tablets weekly).          Interval History 7/30/2024: The patient returns for follow-up on his JAK2 positive myeloproliferative disorder.  He is currently on treatment with

## 2024-08-28 RX ORDER — HYDROXYUREA 500 MG/1
500 CAPSULE ORAL 2 TIMES DAILY
Qty: 180 CAPSULE | Refills: 1 | Status: SHIPPED | OUTPATIENT
Start: 2024-08-28

## 2024-08-28 NOTE — TELEPHONE ENCOUNTER
Requested Prescriptions     Pending Prescriptions Disp Refills    hydroxyurea (HYDREA) 500 MG chemo capsule       Sig: Take 1 capsule by mouth 2 times daily       Date of last visit: 7/30/2024   Date of next visit: 10/31/2024  Date of last refill: 07/30/24  [facility]

## 2024-09-10 ENCOUNTER — OFFICE VISIT (OUTPATIENT)
Dept: CARDIOLOGY CLINIC | Age: 74
End: 2024-09-10
Payer: MEDICARE

## 2024-09-10 VITALS
SYSTOLIC BLOOD PRESSURE: 148 MMHG | DIASTOLIC BLOOD PRESSURE: 83 MMHG | HEART RATE: 59 BPM | WEIGHT: 193 LBS | BODY MASS INDEX: 29.25 KG/M2 | HEIGHT: 68 IN

## 2024-09-10 DIAGNOSIS — I48.21 PERMANENT ATRIAL FIBRILLATION (HCC): Primary | ICD-10-CM

## 2024-09-10 DIAGNOSIS — R06.02 SOB (SHORTNESS OF BREATH): ICD-10-CM

## 2024-09-10 DIAGNOSIS — I10 PRIMARY HYPERTENSION: ICD-10-CM

## 2024-09-10 DIAGNOSIS — R53.83 OTHER FATIGUE: ICD-10-CM

## 2024-09-10 PROCEDURE — G8417 CALC BMI ABV UP PARAM F/U: HCPCS | Performed by: NUCLEAR MEDICINE

## 2024-09-10 PROCEDURE — 1036F TOBACCO NON-USER: CPT | Performed by: NUCLEAR MEDICINE

## 2024-09-10 PROCEDURE — 3077F SYST BP >= 140 MM HG: CPT | Performed by: NUCLEAR MEDICINE

## 2024-09-10 PROCEDURE — 3017F COLORECTAL CA SCREEN DOC REV: CPT | Performed by: NUCLEAR MEDICINE

## 2024-09-10 PROCEDURE — 99204 OFFICE O/P NEW MOD 45 MIN: CPT | Performed by: NUCLEAR MEDICINE

## 2024-09-10 PROCEDURE — 1123F ACP DISCUSS/DSCN MKR DOCD: CPT | Performed by: NUCLEAR MEDICINE

## 2024-09-10 PROCEDURE — G8428 CUR MEDS NOT DOCUMENT: HCPCS | Performed by: NUCLEAR MEDICINE

## 2024-09-10 PROCEDURE — 3079F DIAST BP 80-89 MM HG: CPT | Performed by: NUCLEAR MEDICINE

## 2024-09-10 RX ORDER — DILTIAZEM HYDROCHLORIDE 120 MG/1
120 CAPSULE, EXTENDED RELEASE ORAL 2 TIMES DAILY
COMMUNITY

## 2024-09-10 ASSESSMENT — ENCOUNTER SYMPTOMS
PHOTOPHOBIA: 0
ABDOMINAL PAIN: 0
BACK PAIN: 0
SHORTNESS OF BREATH: 0
ANAL BLEEDING: 0
VOMITING: 0
BLOOD IN STOOL: 0
CHEST TIGHTNESS: 0
NAUSEA: 0
RECTAL PAIN: 0
ABDOMINAL DISTENTION: 0
COLOR CHANGE: 0
DIARRHEA: 0
CONSTIPATION: 0

## 2024-09-25 ENCOUNTER — TELEPHONE (OUTPATIENT)
Dept: CARDIOLOGY CLINIC | Age: 74
End: 2024-09-25

## 2024-09-25 DIAGNOSIS — I10 PRIMARY HYPERTENSION: ICD-10-CM

## 2024-09-25 DIAGNOSIS — R06.02 SOB (SHORTNESS OF BREATH): ICD-10-CM

## 2024-09-25 DIAGNOSIS — I48.21 PERMANENT ATRIAL FIBRILLATION (HCC): ICD-10-CM

## 2024-09-25 DIAGNOSIS — R53.83 OTHER FATIGUE: ICD-10-CM

## 2024-10-31 ENCOUNTER — HOSPITAL ENCOUNTER (OUTPATIENT)
Dept: INFUSION THERAPY | Age: 74
Discharge: HOME | End: 2024-10-31
Payer: MEDICARE

## 2024-10-31 ENCOUNTER — OFFICE VISIT (OUTPATIENT)
Dept: ONCOLOGY | Age: 74
End: 2024-10-31

## 2024-10-31 VITALS
WEIGHT: 190 LBS | TEMPERATURE: 98.1 F | OXYGEN SATURATION: 96 % | SYSTOLIC BLOOD PRESSURE: 126 MMHG | HEIGHT: 68 IN | HEART RATE: 61 BPM | BODY MASS INDEX: 28.79 KG/M2 | RESPIRATION RATE: 18 BRPM | DIASTOLIC BLOOD PRESSURE: 70 MMHG

## 2024-10-31 VITALS
DIASTOLIC BLOOD PRESSURE: 70 MMHG | OXYGEN SATURATION: 96 % | SYSTOLIC BLOOD PRESSURE: 126 MMHG | TEMPERATURE: 98.1 F | RESPIRATION RATE: 18 BRPM | HEART RATE: 61 BPM

## 2024-10-31 DIAGNOSIS — D53.9 MACROCYTIC ANEMIA: ICD-10-CM

## 2024-10-31 DIAGNOSIS — Z79.64 ENCOUNTER FOR MONITORING OF HYDROXYUREA THERAPY: ICD-10-CM

## 2024-10-31 DIAGNOSIS — Z51.81 ENCOUNTER FOR MONITORING OF HYDROXYUREA THERAPY: ICD-10-CM

## 2024-10-31 DIAGNOSIS — D47.1 MYELOPROLIFERATIVE DISORDER (HCC): ICD-10-CM

## 2024-10-31 DIAGNOSIS — D47.1 MYELOPROLIFERATIVE DISORDER (HCC): Primary | ICD-10-CM

## 2024-10-31 LAB
ALBUMIN SERPL BCG-MCNC: 4 G/DL (ref 3.5–5.1)
ALP SERPL-CCNC: 90 U/L (ref 38–126)
ALT SERPL W/O P-5'-P-CCNC: 12 U/L (ref 11–66)
AST SERPL-CCNC: 24 U/L (ref 5–40)
BASOPHILS # BLD AUTO: 0 THOU/MM3 (ref 0–0.1)
BASOPHILS NFR BLD AUTO: 1 % (ref 0–3)
BILIRUB CONJ SERPL-MCNC: < 0.1 MG/DL (ref 0.1–13.8)
BILIRUB SERPL-MCNC: 0.3 MG/DL (ref 0.3–1.2)
BUN BLDP-MCNC: 32 MG/DL (ref 8–26)
CHLORIDE BLD-SCNC: 108 MEQ/L (ref 98–109)
CREAT BLD-MCNC: 1 MG/DL (ref 0.5–1.2)
EOSINOPHIL # BLD AUTO: 0.1 THOU/MM3 (ref 0–0.4)
EOSINOPHIL NFR BLD AUTO: 2 % (ref 0–4)
ERYTHROCYTE [DISTWIDTH] IN BLOOD BY AUTOMATED COUNT: 12.7 % (ref 11.5–14.5)
FOLATE SERPL-MCNC: > 20 NG/ML (ref 4.8–24.2)
GFR SERPL CREATININE-BSD FRML MDRD: 79 ML/MIN/1.73M2
GLUCOSE BLD-MCNC: 87 MG/DL (ref 70–108)
HCT VFR BLD AUTO: 39.6 % (ref 42–52)
HGB BLD-MCNC: 13.6 GM/DL (ref 14–18)
IMM GRANULOCYTES # BLD AUTO: 0.03 THOU/MM3 (ref 0–0.07)
IMM GRANULOCYTES NFR BLD AUTO: 1 %
IONIZED CALCIUM, WHOLE BLOOD: 1.24 MMOL/L (ref 1.12–1.32)
LDH SERPL L TO P-CCNC: 252 U/L (ref 100–190)
LYMPHOCYTES # BLD AUTO: 1 THOU/MM3 (ref 1–4.8)
LYMPHOCYTES NFR BLD AUTO: 22 % (ref 15–47)
MCH RBC QN AUTO: 40.8 PG (ref 26–33)
MCHC RBC AUTO-ENTMCNC: 34.3 GM/DL (ref 32.2–35.5)
MCV RBC AUTO: 119 FL (ref 80–94)
MONOCYTES # BLD AUTO: 0.5 THOU/MM3 (ref 0.4–1.3)
MONOCYTES NFR BLD AUTO: 12 % (ref 0–12)
NEUTROPHILS ABSOLUTE: 2.9 THOU/MM3 (ref 1.8–7.7)
NEUTROPHILS NFR BLD AUTO: 63 % (ref 43–75)
PATHOLOGIST REVIEW: ABNORMAL
PLATELET # BLD AUTO: 297 THOU/MM3 (ref 130–400)
PMV BLD AUTO: 9.9 FL (ref 9.4–12.4)
POTASSIUM BLD-SCNC: 4.9 MEQ/L (ref 3.5–4.9)
PROT SERPL-MCNC: 6.8 G/DL (ref 6.1–8)
RBC # BLD AUTO: 3.33 MILL/MM3 (ref 4.7–6.1)
SODIUM BLD-SCNC: 145 MEQ/L (ref 138–146)
TOTAL CO2, WHOLE BLOOD: 31 MEQ/L (ref 23–33)
VIT B12 SERPL-MCNC: 1902 PG/ML (ref 211–911)
WBC # BLD AUTO: 4.5 THOU/MM3 (ref 4.8–10.8)

## 2024-10-31 PROCEDURE — 82746 ASSAY OF FOLIC ACID SERUM: CPT

## 2024-10-31 PROCEDURE — 84155 ASSAY OF PROTEIN SERUM: CPT

## 2024-10-31 PROCEDURE — 82784 ASSAY IGA/IGD/IGG/IGM EACH: CPT

## 2024-10-31 PROCEDURE — 86334 IMMUNOFIX E-PHORESIS SERUM: CPT

## 2024-10-31 PROCEDURE — 83883 ASSAY NEPHELOMETRY NOT SPEC: CPT

## 2024-10-31 PROCEDURE — 80047 BASIC METABLC PNL IONIZED CA: CPT

## 2024-10-31 PROCEDURE — 80076 HEPATIC FUNCTION PANEL: CPT

## 2024-10-31 PROCEDURE — 99211 OFF/OP EST MAY X REQ PHY/QHP: CPT

## 2024-10-31 PROCEDURE — 82607 VITAMIN B-12: CPT

## 2024-10-31 PROCEDURE — 84165 PROTEIN E-PHORESIS SERUM: CPT

## 2024-10-31 PROCEDURE — 85025 COMPLETE CBC W/AUTO DIFF WBC: CPT

## 2024-10-31 PROCEDURE — 83615 LACTATE (LD) (LDH) ENZYME: CPT

## 2024-10-31 RX ORDER — HYDROXYUREA 500 MG/1
500 CAPSULE ORAL 2 TIMES DAILY
Qty: 180 CAPSULE | Refills: 1 | Status: SHIPPED | OUTPATIENT
Start: 2024-10-31

## 2024-10-31 NOTE — PROGRESS NOTES
Dehydrogenase; Future  - Haptoglobin; Future  - Reticulocytes; Future  - Path Review, Smear; Future      Return in about 13 weeks (around 1/30/2025).       All patient questions answered. Pt voiced understanding. Patient agreed with treatment plan. Follow up as directed. Patient instructed to call for questions or concerns.       Electronically signed by   JOSE C Galeano CNP

## 2024-11-01 LAB
FREE KAPPA/LAMBDA RATIO: 1.3 (ref 0.22–1.74)
KAPPA LC FREE SER-MCNC: 29.1 MG/L
LAMBDA LC FREE SERPL-MCNC: 22.4 MG/L (ref 4.2–27.7)

## 2024-11-03 LAB — IMMUNOFIXATION WITH QUANT: NORMAL

## 2025-01-30 ENCOUNTER — HOSPITAL ENCOUNTER (OUTPATIENT)
Dept: INFUSION THERAPY | Age: 75
Discharge: HOME | End: 2025-01-30
Payer: MEDICARE

## 2025-01-30 ENCOUNTER — OFFICE VISIT (OUTPATIENT)
Dept: ONCOLOGY | Age: 75
End: 2025-01-30
Payer: MEDICARE

## 2025-01-30 VITALS
DIASTOLIC BLOOD PRESSURE: 67 MMHG | SYSTOLIC BLOOD PRESSURE: 128 MMHG | RESPIRATION RATE: 16 BRPM | HEART RATE: 62 BPM | OXYGEN SATURATION: 96 % | TEMPERATURE: 97.6 F

## 2025-01-30 VITALS
TEMPERATURE: 97.6 F | DIASTOLIC BLOOD PRESSURE: 67 MMHG | SYSTOLIC BLOOD PRESSURE: 128 MMHG | HEIGHT: 68 IN | HEART RATE: 62 BPM | OXYGEN SATURATION: 96 % | WEIGHT: 194.6 LBS | BODY MASS INDEX: 29.49 KG/M2 | RESPIRATION RATE: 16 BRPM

## 2025-01-30 DIAGNOSIS — D47.1 MYELOPROLIFERATIVE DISORDER (HCC): Primary | ICD-10-CM

## 2025-01-30 DIAGNOSIS — Z51.81 ENCOUNTER FOR MONITORING OF HYDROXYUREA THERAPY: ICD-10-CM

## 2025-01-30 DIAGNOSIS — Z79.64 ENCOUNTER FOR MONITORING OF HYDROXYUREA THERAPY: ICD-10-CM

## 2025-01-30 DIAGNOSIS — D53.9 MACROCYTIC ANEMIA: ICD-10-CM

## 2025-01-30 DIAGNOSIS — D47.1 MYELOPROLIFERATIVE DISORDER (HCC): ICD-10-CM

## 2025-01-30 LAB
ALBUMIN SERPL BCG-MCNC: 4 G/DL (ref 3.5–5.1)
ALP SERPL-CCNC: 67 U/L (ref 38–126)
ALT SERPL W/O P-5'-P-CCNC: 12 U/L (ref 11–66)
AST SERPL-CCNC: 22 U/L (ref 5–40)
BASOPHILS # BLD AUTO: 0 THOU/MM3 (ref 0–0.1)
BASOPHILS NFR BLD AUTO: 0 % (ref 0–3)
BILIRUB CONJ SERPL-MCNC: 0.2 MG/DL (ref 0.1–13.8)
BILIRUB SERPL-MCNC: 0.5 MG/DL (ref 0.3–1.2)
BUN BLDP-MCNC: 21 MG/DL (ref 8–26)
CHLORIDE BLD-SCNC: 106 MEQ/L (ref 98–109)
CREAT BLD-MCNC: 0.9 MG/DL (ref 0.5–1.2)
EOSINOPHIL # BLD AUTO: 0.1 THOU/MM3 (ref 0–0.4)
EOSINOPHIL NFR BLD AUTO: 3 % (ref 0–4)
ERYTHROCYTE [DISTWIDTH] IN BLOOD BY AUTOMATED COUNT: 12 % (ref 11.5–14.5)
FERRITIN SERPL IA-MCNC: 45 NG/ML (ref 22–322)
FOLATE SERPL-MCNC: > 20 NG/ML (ref 4.8–24.2)
GFR SERPL CREATININE-BSD FRML MDRD: 90 ML/MIN/1.73M2
GLUCOSE BLD-MCNC: 95 MG/DL (ref 70–108)
HAPTOGLOB SERPL-MCNC: 40 MG/DL (ref 30–200)
HCT VFR BLD AUTO: 39.6 % (ref 42–52)
HGB BLD-MCNC: 14.1 GM/DL (ref 14–18)
HGB RETIC QN AUTO: 43.1 PG (ref 28.2–35.7)
IMM GRANULOCYTES # BLD AUTO: 0.01 THOU/MM3 (ref 0–0.07)
IMM GRANULOCYTES NFR BLD AUTO: 0 %
IMM RETICS NFR: 20.2 % (ref 2.3–13.4)
IONIZED CALCIUM, WHOLE BLOOD: 1.28 MMOL/L (ref 1.12–1.32)
IRON SATN MFR SERPL: 36 % (ref 20–50)
IRON SERPL-MCNC: 105 UG/DL (ref 65–195)
LDH SERPL L TO P-CCNC: 222 U/L (ref 100–190)
LYMPHOCYTES # BLD AUTO: 0.8 THOU/MM3 (ref 1–4.8)
LYMPHOCYTES NFR BLD AUTO: 22 % (ref 15–47)
MCH RBC QN AUTO: 41.1 PG (ref 26–33)
MCHC RBC AUTO-ENTMCNC: 35.6 GM/DL (ref 32.2–35.5)
MCV RBC AUTO: 116 FL (ref 80–94)
MONOCYTES # BLD AUTO: 0.3 THOU/MM3 (ref 0.4–1.3)
MONOCYTES NFR BLD AUTO: 9 % (ref 0–12)
NEUTROPHILS ABSOLUTE: 2.5 THOU/MM3 (ref 1.8–7.7)
NEUTROPHILS NFR BLD AUTO: 66 % (ref 43–75)
PLATELET # BLD AUTO: 240 THOU/MM3 (ref 130–400)
PMV BLD AUTO: 9.9 FL (ref 9.4–12.4)
POTASSIUM BLD-SCNC: 4 MEQ/L (ref 3.5–4.9)
PROT SERPL-MCNC: 6.3 G/DL (ref 6.1–8)
RBC # BLD AUTO: 3.43 MILL/MM3 (ref 4.7–6.1)
RETICS # AUTO: 42 THOU/MM3 (ref 20–115)
RETICS/RBC NFR AUTO: 1.2 % (ref 0.5–2)
SODIUM BLD-SCNC: 142 MEQ/L (ref 138–146)
TIBC SERPL-MCNC: 290 UG/DL (ref 171–450)
TOTAL CO2, WHOLE BLOOD: 28 MEQ/L (ref 23–33)
VIT B12 SERPL-MCNC: 1488 PG/ML (ref 211–911)
WBC # BLD AUTO: 3.8 THOU/MM3 (ref 4.8–10.8)

## 2025-01-30 PROCEDURE — G8427 DOCREV CUR MEDS BY ELIG CLIN: HCPCS | Performed by: NURSE PRACTITIONER

## 2025-01-30 PROCEDURE — 80047 BASIC METABLC PNL IONIZED CA: CPT

## 2025-01-30 PROCEDURE — 3017F COLORECTAL CA SCREEN DOC REV: CPT | Performed by: NURSE PRACTITIONER

## 2025-01-30 PROCEDURE — 1160F RVW MEDS BY RX/DR IN RCRD: CPT | Performed by: NURSE PRACTITIONER

## 2025-01-30 PROCEDURE — 83540 ASSAY OF IRON: CPT

## 2025-01-30 PROCEDURE — 85046 RETICYTE/HGB CONCENTRATE: CPT

## 2025-01-30 PROCEDURE — 83550 IRON BINDING TEST: CPT

## 2025-01-30 PROCEDURE — 1123F ACP DISCUSS/DSCN MKR DOCD: CPT | Performed by: NURSE PRACTITIONER

## 2025-01-30 PROCEDURE — 85025 COMPLETE CBC W/AUTO DIFF WBC: CPT

## 2025-01-30 PROCEDURE — 82607 VITAMIN B-12: CPT

## 2025-01-30 PROCEDURE — 1126F AMNT PAIN NOTED NONE PRSNT: CPT | Performed by: NURSE PRACTITIONER

## 2025-01-30 PROCEDURE — 80076 HEPATIC FUNCTION PANEL: CPT

## 2025-01-30 PROCEDURE — 83010 ASSAY OF HAPTOGLOBIN QUANT: CPT

## 2025-01-30 PROCEDURE — 1159F MED LIST DOCD IN RCRD: CPT | Performed by: NURSE PRACTITIONER

## 2025-01-30 PROCEDURE — G8417 CALC BMI ABV UP PARAM F/U: HCPCS | Performed by: NURSE PRACTITIONER

## 2025-01-30 PROCEDURE — 99211 OFF/OP EST MAY X REQ PHY/QHP: CPT

## 2025-01-30 PROCEDURE — 82728 ASSAY OF FERRITIN: CPT

## 2025-01-30 PROCEDURE — 83615 LACTATE (LD) (LDH) ENZYME: CPT

## 2025-01-30 PROCEDURE — 1036F TOBACCO NON-USER: CPT | Performed by: NURSE PRACTITIONER

## 2025-01-30 PROCEDURE — 82746 ASSAY OF FOLIC ACID SERUM: CPT

## 2025-01-30 PROCEDURE — 99214 OFFICE O/P EST MOD 30 MIN: CPT | Performed by: NURSE PRACTITIONER

## 2025-01-30 NOTE — PROGRESS NOTES
Systems   Pertinent review of systems noted in HPI, all other ROS negative.   Objective:   Physical Exam   /67 (Site: Right Upper Arm, Position: Sitting, Cuff Size: Medium Adult)   Pulse 62   Temp 97.6 °F (36.4 °C) (Oral)   Resp 16   Ht 1.727 m (5' 8\")   Wt 88.3 kg (194 lb 9.6 oz)   SpO2 96%   BMI 29.59 kg/m²    General appearance: No apparent distress, calm and cooperative.  HEENT: Pupils equal, round, and reactive to light. Conjunctivae/corneas clear. Oral mucosa intact  Neck: Supple, with full range of motion. Trachea midline.   Respiratory:  Normal respiratory effort. Clear to auscultation, bilaterally without Rales/Wheezes/Rhonchi.  Cardiovascular: Regular rate and rhythm with normal S1/S2 without murmurs, rubs or gallops.   Abdomen: Soft, non-tender, non-distended with active bowel sounds.  Musculoskeletal: No clubbing, cyanosis or edema bilaterally.    Skin: Skin color, texture, turgor normal.  No visible rashes or lesions.  Neurologic:  Neurovascularly intact without any focal sensory/motor deficits.   Psychiatric: Alert and oriented, thought content appropriate, normal insight  Capillary Refill: Brisk,< 3 seconds   Peripheral Pulses: +2 palpable, equal bilaterally       Imaging Studies and Labs:   CBC:   Lab Results   Component Value Date    WBC 3.8 (L) 01/30/2025    HGB 14.1 01/30/2025    HCT 39.6 (L) 01/30/2025     (H) 01/30/2025     01/30/2025     BMP:   Lab Results   Component Value Date/Time     01/30/2025 08:23 AM     11/29/2021 04:21 PM    K 4.0 01/30/2025 08:23 AM    K 3.9 11/29/2021 04:21 PM     11/29/2021 04:21 PM    CO2 28 11/29/2021 04:21 PM    BUN 19 11/29/2021 04:21 PM    CREATININE 0.9 01/30/2025 08:23 AM    CREATININE 0.9 11/29/2021 04:21 PM    GLUCOSE 92 11/29/2021 04:21 PM    CALCIUM 9.7 07/19/2022 09:43 AM      LFT:   Lab Results   Component Value Date    ALT 12 10/31/2024    AST 24 10/31/2024    ALKPHOS 90 10/31/2024    BILITOT 0.3 10/31/2024

## 2025-01-30 NOTE — PATIENT INSTRUCTIONS
Change treatment to Hydrea one capsule every Monday, Wednesday, Friday and Sunday, two capsules every Tuesday, Thursday and Saturday.    Return to clinic for follow up in 3 months  Notify the office of any new or worsening symptoms     09/21/2020 platelet count 455,000 (150,000-450,000)  12/23/2020 platelet count 555,000  11/29/2021 JAK2 positive myeloproliferative disorder with an elevated platelet count - started treatment with Hydrea    09/10/2024 started treatment with Eliquis

## 2025-02-04 LAB
REVIEWED BY: NORMAL
SMEAR REVIEW: NORMAL

## 2025-02-15 NOTE — PATIENT INSTRUCTIONS
Return to clinic for follow up in 3 months  Notify the office of any new or worsening symptoms    Sinus infections happen when a virus, bacteria or mold settles in the sinuses and cause inflammation. The most common cause of sinus infections is a cold/virus. Antibiotics only treat sinus infections caused by bacteria. If you have a virus an antibiotic will not help. Overuse of antibiotics has caused increased antibiotic resistance. The American Academy of Otolaryngology - Head and Neck Surgery recommend that Antibiotics may be prescribed if symptoms last more than 10 days. Follow symptom management instructions below.       Symptoms Management:     Instructions for Adults:     Take Mucinex (guaifenesin) 600 mg twice daily, this is an expectorant which means it thins the mucus/phlegm so one can blow, cough or spit mucus/phlegm out better.    Take Flonase nasal spray 2 sprays in each nostril daily for 2 weeks for nasal congestion.     If needed you may take Sudafed as instructed on the package, if sinus pressure is present (do not take if you have history of high blood pressure/hypertension, are pregnant or breastfeeding).      If needed you may take Tylenol every 4 hours as needed for fever or aches (Do not exceed 4,000mg in 24 hours).    Drink Warm tea with honey.    Salt water gargles and throat lozenges for sore throat.    Instructions for Pregnant Women  The medications listed below may be used during pregnancy.    If needed you may take Tylenol every 4 hours as needed for fever or aches (Do not exceed 4,000mg in 24 hours).    Drink Warm tea with honey.    For nasal congestion Claritin (loratadine), Zyrtec (cetirizine), or Benadryl (diphenhydramine) may be taken.   Salt water gargles and throat lozenges for sore throat.   Take Mucinex (guaifenesin) 600 mg twice daily, this is an expectorant which means it thins the mucus/phlegm so one can blow, cough or spit mucus/phlegm out better.  Take Dextromethorphan HBr and cough drops for cough.     Instructions for Children:       Give Tylenol or ibuprofen as needed  for fever.    Do not give ibuprofen if your child is not drinking enough fluids or are throwing up a lot.   Use Nose Colleen and nasal saline to help suction mucus.  Salt water gargles if able to follow instructions.  Throat lozenges may be given to children 4 and over for a sore throat. Follow package instructions.   Do not give over the counter cough and cold medications to children under 6 years of age. These medications can have serious side effects.    Use mentholated rubs over the chest and front of neck. This can help soothe a cough.    Honey can also help a cough (do not given to babies under 1 year of age):   1-5 years old give ½ teaspoon.    6-11 years old give 1 teaspoon.   and older give 2 teaspoons of honey.      Instructions for Everyone:     Drink lots of fluids.     Use a cool mist Humidifier.     Use a warm moist compress to sinuses 4-6 times daily to help facilitate sinus drainage.      Sterile saline rinses 3 times daily (nasal saline spray)    Cover coughs or sneezes.      Practice good hand hygiene.        What to do in an Emergency:  If you are experiencing a medical emergency, call 911 immediately. Symptoms that require immediate attention require a visit at Urgent Care ([place]), Im[facility] Center ([place]) or the Emergency Room of a nearby hospital.    When to contact a provider:  Return if there is no improvement of symptoms. If worsening symptoms an in-person visit is needed.     Do not have a primary care provider?   If you do not have a primary care provider or have any questions about your visit, please call the [facility] RN at [phone].    Billing Questions:   If you have any billing concerns, please contact our Billing Department at [phone]. Hours: Mon. - Thu. 7:30 am - 6 pm and Friday 7:30 pm to 5 pm.    Thank you for entrusting us with your care.     You can connect by Video with a [facility] provider 24/7 for common and non-urgent symptoms. You can also get care by  completing an E-Visit questionnaire. Complete a questionnaire by choosing from a list of common health concerns*. A [facility] provider will respond to your questionnaire answers within an 1 hour (24/7). You will receive a treatment plan, including a prescription if you need one, and/or testing for COVID, Influenza, Mono, RSV, Strep and urine, depending on your symptoms.     Cold Symptoms Behavioral   Health Skin Concerns Women's and Men's   Health   Everything Else   Cough*    Anxiety* Acne                                 Birth Control Abdominal Discomfort     COVID treatment* Depression *  Bug Bites   Emergency Contraception Acid Reflux   Nasal congestion* Insomnia Cold Sores Erectile Dysfunction                      Excessive Sweating (underarms)          Red/pink eye  Dandruff Smoking Cessation    Headache or migraine*        Sore throat*  Eczema Urinary Symptoms* Joint pain or stiffness       Sinus infection*    Minor Skin Injuries Vaginal Itching*  Medication Refills*         Poison Ivy Vaginal Discharge* Nausea, vomiting and diarrhea         Rash   Neck and Back Pain*       Shingles  Seasonal Allergies          Tick bites             Sinusitis (Antibiotic Treatment)    The sinuses are air-filled spaces within the bones of the face. They connect to the inside of the nose. Sinusitis is an inflammation of the tissue that lines the sinuses. Sinusitis can occur during a cold. It can also happen due to allergies to pollens and other particles in the air. Sinusitis can cause symptoms of sinus congestion and a feeling of fullness. A sinus infection causes fever, headache, and facial pain. There is often green or yellow fluid draining from the nose or into the back of the throat (post-nasal drip). You have been given antibiotics to treat this condition.   Home care  Take the full course of antibiotics as instructed. Don't stop taking them, even when you feel better.  Drink plenty of water, hot tea, and other liquids  as directed by the healthcare provider. This may help thin nasal mucus. It also may help your sinuses drain fluids.  Heat may help soothe painful areas of your face. Use a towel soaked in hot water. Or,  the shower and direct the warm spray onto your face. Using a vaporizer along with a menthol rub at night may also help soothe symptoms.   An expectorant with guaifenesin may help thin nasal mucus and help your sinuses drain fluids. Talk with your provider or pharmacists before taking an over-the-counter (OTC) medicine if you have any questions about it or its side effects..  You can use an OTC decongestant, unless a similar medicine was prescribed to you. Nasal sprays work the fastest. Use one that contains phenylephrine or oxymetazoline. First blow your nose gently. Then use the spray. Don't use these medicines more often than directed on the label. If you do, your symptoms may get worse. You may also take pills that contain pseudoephedrine. Don’t use products that combine multiple medicines. This is because side effects may be increased. Read labels. You can also ask the pharmacist for help. (People with high blood pressure should not use decongestants. They can raise blood pressure.) Talk with your provider or pharmacist if you have any questions about the medicine..  OTC antihistamines may help if allergies contributed to your sinusitis. Talk with your provider or pharmacist if you have any questions about the medicine..  Don't use nasal rinses or irrigation during an acute sinus infection, unless your healthcare provider tells you to. Rinsing may spread the infection to other areas in your sinuses.  Use acetaminophen or ibuprofen to control pain, unless another pain medicine was prescribed to you. If you have chronic liver or kidney disease or ever had a stomach ulcer, talk with your healthcare provider before using these medicines. Never give aspirin to anyone under age 18 who is ill with a fever. It  may cause severe liver damage.  Don't smoke. This can make symptoms worse.    Follow-up care  Follow up with your healthcare provider, or as advised.   When to seek medical advice  Call your healthcare provider if any of these occur:   Facial pain or headache that gets worse  Stiff neck  Unusual drowsiness or confusion  Swelling of your forehead or eyelids  Symptoms don't go away in 10 days  Vision problems, such as blurred or double vision  Fever of 100.4ºF (38ºC) or higher, or as directed by your healthcare provider  Call 911  Call 911 if any of these occur:   Seizure  Trouble breathing  Feeling dizzy or faint  Fingernails, skin or lips look blue, purple , or gray  Prevention  Here are steps you can take to help prevent an infection:   Keep good hand washing habits.  Don’t have close contact with people who have sore throats, colds, or other upper respiratory infections.  Don’t smoke, and stay away from secondhand smoke.  Stay up to date with of your vaccines.  [employer] last reviewed this educational content on 12/1/2019 © 2000-2021 [employer]. All rights reserved. This information is not intended as a substitute for professional medical care. Always follow your healthcare professional's instructions.

## 2025-04-30 NOTE — PROGRESS NOTES
BMP:   Lab Results   Component Value Date/Time     05/01/2025 08:01 AM     11/29/2021 04:21 PM    K 4.1 05/01/2025 08:01 AM    K 3.9 11/29/2021 04:21 PM     11/29/2021 04:21 PM    CO2 28 11/29/2021 04:21 PM    BUN 19 11/29/2021 04:21 PM    CREATININE 1.1 05/01/2025 08:01 AM    CREATININE 0.9 11/29/2021 04:21 PM    GLUCOSE 92 11/29/2021 04:21 PM    CALCIUM 9.7 07/19/2022 09:43 AM      LFT:   Lab Results   Component Value Date    ALT 12 01/30/2025    AST 22 01/30/2025    ALKPHOS 67 01/30/2025    BILITOT 0.5 01/30/2025      Assessment & Plan   Assessment and Plan:   1. Myeloproliferative disorder (HCC)  JAK2 positive myeloproliferative disorder with an elevated platelet count      01/30/2025 platelet count 240,000    04/04/2025 platelet count 461,000    Current platelet count 401,000    - CBC with Auto Differential; Future  - Hepatic Function Panel; Future  - POC PANEL BMP W/IOCA; Future    2. Encounter for monitoring of hydroxyurea therapy  01/30/2025 WBC count decreased while on concurrent treatment with methotrexate, recommended to change treatment to Hydrea 500 mg one capsule every Monday, Wednesday, Friday and Sunday with two capsules every Tuesday, Thursday and Saturday.  He verbalized an understanding.    WBC 5.7, continue treatment as previously prescribed      Return in about 13 weeks (around 7/31/2025).       All patient questions answered. Pt voiced understanding. Patient agreed with treatment plan. Follow up as directed. Patient instructed to call for questions or concerns.       Electronically signed by   JOSE C Galeano CNP

## 2025-05-01 ENCOUNTER — HOSPITAL ENCOUNTER (OUTPATIENT)
Dept: INFUSION THERAPY | Age: 75
Discharge: HOME | End: 2025-05-01
Payer: MEDICARE

## 2025-05-01 ENCOUNTER — OFFICE VISIT (OUTPATIENT)
Dept: ONCOLOGY | Age: 75
End: 2025-05-01
Payer: MEDICARE

## 2025-05-01 VITALS
SYSTOLIC BLOOD PRESSURE: 146 MMHG | DIASTOLIC BLOOD PRESSURE: 73 MMHG | HEART RATE: 65 BPM | OXYGEN SATURATION: 96 % | TEMPERATURE: 97.7 F | RESPIRATION RATE: 16 BRPM

## 2025-05-01 VITALS
DIASTOLIC BLOOD PRESSURE: 73 MMHG | HEIGHT: 68 IN | WEIGHT: 199.6 LBS | HEART RATE: 65 BPM | TEMPERATURE: 97.7 F | SYSTOLIC BLOOD PRESSURE: 146 MMHG | OXYGEN SATURATION: 96 % | RESPIRATION RATE: 16 BRPM | BODY MASS INDEX: 30.25 KG/M2

## 2025-05-01 DIAGNOSIS — D47.1 MYELOPROLIFERATIVE DISORDER (HCC): Primary | ICD-10-CM

## 2025-05-01 DIAGNOSIS — Z51.81 ENCOUNTER FOR MONITORING OF HYDROXYUREA THERAPY: ICD-10-CM

## 2025-05-01 DIAGNOSIS — D47.1 MYELOPROLIFERATIVE DISORDER (HCC): ICD-10-CM

## 2025-05-01 DIAGNOSIS — Z79.64 ENCOUNTER FOR MONITORING OF HYDROXYUREA THERAPY: ICD-10-CM

## 2025-05-01 LAB
ALBUMIN SERPL BCG-MCNC: 3.9 G/DL (ref 3.4–4.9)
ALP SERPL-CCNC: 76 U/L (ref 40–129)
ALT SERPL W/O P-5'-P-CCNC: 14 U/L (ref 10–50)
AST SERPL-CCNC: 29 U/L (ref 10–50)
BASOPHILS # BLD AUTO: 0 THOU/MM3 (ref 0–0.1)
BASOPHILS NFR BLD AUTO: 0 % (ref 0–3)
BILIRUB CONJ SERPL-MCNC: 0.2 MG/DL (ref 0–0.2)
BILIRUB SERPL-MCNC: 0.4 MG/DL (ref 0.3–1.2)
BUN BLDP-MCNC: 22 MG/DL (ref 8–26)
CHLORIDE BLD-SCNC: 107 MEQ/L (ref 98–109)
CREAT BLD-MCNC: 1.1 MG/DL (ref 0.5–1.2)
EOSINOPHIL # BLD AUTO: 0.1 THOU/MM3 (ref 0–0.4)
EOSINOPHIL NFR BLD AUTO: 3 % (ref 0–4)
ERYTHROCYTE [DISTWIDTH] IN BLOOD BY AUTOMATED COUNT: 11.3 % (ref 11.5–14.5)
GFR SERPL CREATININE-BSD FRML MDRD: 71 ML/MIN/1.73M2
GLUCOSE BLD-MCNC: 85 MG/DL (ref 70–108)
HCT VFR BLD AUTO: 41.6 % (ref 42–52)
HGB BLD-MCNC: 14.2 GM/DL (ref 14–18)
IMM GRANULOCYTES # BLD AUTO: 0.02 THOU/MM3 (ref 0–0.07)
IMM GRANULOCYTES NFR BLD AUTO: 0 %
IONIZED CALCIUM, WHOLE BLOOD: 1.18 MMOL/L (ref 1.12–1.32)
LYMPHOCYTES # BLD AUTO: 1 THOU/MM3 (ref 1–4.8)
LYMPHOCYTES NFR BLD AUTO: 18 % (ref 15–47)
MCH RBC QN AUTO: 38.5 PG (ref 26–33)
MCHC RBC AUTO-ENTMCNC: 34.1 GM/DL (ref 32.2–35.5)
MCV RBC AUTO: 113 FL (ref 80–94)
MONOCYTES # BLD AUTO: 0.7 THOU/MM3 (ref 0.4–1.3)
MONOCYTES NFR BLD AUTO: 13 % (ref 0–12)
NEUTROPHILS ABSOLUTE: 3.7 THOU/MM3 (ref 1.8–7.7)
NEUTROPHILS NFR BLD AUTO: 66 % (ref 43–75)
PATHOLOGIST REVIEW: ABNORMAL
PLATELET # BLD AUTO: 401 THOU/MM3 (ref 130–400)
PMV BLD AUTO: 10.2 FL (ref 9.4–12.4)
POTASSIUM BLD-SCNC: 4.1 MEQ/L (ref 3.5–4.9)
PROT SERPL-MCNC: 6.4 G/DL (ref 6.4–8.3)
RBC # BLD AUTO: 3.69 MILL/MM3 (ref 4.7–6.1)
SODIUM BLD-SCNC: 146 MEQ/L (ref 138–146)
TOTAL CO2, WHOLE BLOOD: 30 MEQ/L (ref 23–33)
WBC # BLD AUTO: 5.7 THOU/MM3 (ref 4.8–10.8)

## 2025-05-01 PROCEDURE — G8417 CALC BMI ABV UP PARAM F/U: HCPCS | Performed by: NURSE PRACTITIONER

## 2025-05-01 PROCEDURE — 99211 OFF/OP EST MAY X REQ PHY/QHP: CPT

## 2025-05-01 PROCEDURE — 85025 COMPLETE CBC W/AUTO DIFF WBC: CPT

## 2025-05-01 PROCEDURE — 1160F RVW MEDS BY RX/DR IN RCRD: CPT | Performed by: NURSE PRACTITIONER

## 2025-05-01 PROCEDURE — 80076 HEPATIC FUNCTION PANEL: CPT

## 2025-05-01 PROCEDURE — 99214 OFFICE O/P EST MOD 30 MIN: CPT | Performed by: NURSE PRACTITIONER

## 2025-05-01 PROCEDURE — 3017F COLORECTAL CA SCREEN DOC REV: CPT | Performed by: NURSE PRACTITIONER

## 2025-05-01 PROCEDURE — 80047 BASIC METABLC PNL IONIZED CA: CPT

## 2025-05-01 PROCEDURE — 1036F TOBACCO NON-USER: CPT | Performed by: NURSE PRACTITIONER

## 2025-05-01 PROCEDURE — 1123F ACP DISCUSS/DSCN MKR DOCD: CPT | Performed by: NURSE PRACTITIONER

## 2025-05-01 PROCEDURE — 1126F AMNT PAIN NOTED NONE PRSNT: CPT | Performed by: NURSE PRACTITIONER

## 2025-05-01 PROCEDURE — G8427 DOCREV CUR MEDS BY ELIG CLIN: HCPCS | Performed by: NURSE PRACTITIONER

## 2025-05-01 PROCEDURE — 1159F MED LIST DOCD IN RCRD: CPT | Performed by: NURSE PRACTITIONER

## 2025-07-29 NOTE — PROGRESS NOTES
[facility] [place] [facility]  [facility]  [address]  [address]  [facility] [address]  Dept: [phone]  Loc: [phone]       Visit Date:7/31/2025     Lion Kaminski is a 74 y.o. male who presents today for:   Chief Complaint   Patient presents with    Follow-up     Myeloproliferative disorder (HCC)          HPI:   Lion Kaminski is a 74 y.o. male with JAK2 positive myeloproliferative disorder with an elevated platelet count. He was first noted to have an elevated platelet count in December 2020.  At that time his platelet count was 555,000.  He had a similar result in March 2021.  In June 2021 his platelet count was 762,000.       10/18/2022 The patient's platelet count increased to 642,000.  He was on treatment with Hydrea 500 mg by mouth daily.  The patient was instructed to increase his treatment to Hydrea 500 mg by mouth twice daily.        11/15/2022 The patient's CBC results revealed WBCs 5.3, Hgb 14.3, HCT 42.5%, , RDW 14.4% and platelet count improved to 440,000 from his previous value of 614,000.      01/03/2023 CBC results revealed WBCs 4.8, Hgb 13.8, HCT 40.2%, , RDW 15.5% and platelet count 349,000.  He was continued on treatment with Hydrea 500 mg by mouth twice daily.     04/3/2023 CBC results reveal WBCs 5.9, Hgb 14.6, HCT 40.9% and platelet count 234,000.       01/08/2024 WBC 3.8, Hgb 14.4, HCT 40.8% and platelet count 262,000.     01/09/2024 GFR and BMP results within normal limits.  WBC 4.1, Hgb 14.3, HCT 41.2%, , RDW 12% and platelet count 235,000.        07/23/2024 the patient was seen for follow-up evaluation with Dr. Perez.  He is currently on treatment with methotrexate 2.5 mg, 2 tablets by mouth once weekly, taking on Sunday (previously on treatment with 3 tablets weekly).     07/30/2024 WBC 4.1, Hgb 14.3, HCT 41.2%, , RDW 12% and platelet count 235,000.        09/10/2024 incidental finding of A-fib during a wellness

## 2025-07-31 ENCOUNTER — HOSPITAL ENCOUNTER (OUTPATIENT)
Dept: INFUSION THERAPY | Age: 75
Discharge: HOME | End: 2025-07-31
Payer: MEDICARE

## 2025-07-31 ENCOUNTER — OFFICE VISIT (OUTPATIENT)
Dept: ONCOLOGY | Age: 75
End: 2025-07-31
Payer: MEDICARE

## 2025-07-31 VITALS
OXYGEN SATURATION: 94 % | TEMPERATURE: 97.8 F | RESPIRATION RATE: 16 BRPM | BODY MASS INDEX: 29.7 KG/M2 | HEART RATE: 57 BPM | SYSTOLIC BLOOD PRESSURE: 127 MMHG | HEIGHT: 68 IN | DIASTOLIC BLOOD PRESSURE: 76 MMHG | WEIGHT: 196 LBS

## 2025-07-31 VITALS
DIASTOLIC BLOOD PRESSURE: 76 MMHG | RESPIRATION RATE: 16 BRPM | TEMPERATURE: 97.8 F | HEART RATE: 57 BPM | OXYGEN SATURATION: 94 % | SYSTOLIC BLOOD PRESSURE: 127 MMHG

## 2025-07-31 DIAGNOSIS — Z51.81 ENCOUNTER FOR MONITORING OF HYDROXYUREA THERAPY: ICD-10-CM

## 2025-07-31 DIAGNOSIS — D47.1 MYELOPROLIFERATIVE DISORDER (HCC): ICD-10-CM

## 2025-07-31 DIAGNOSIS — D47.1 MYELOPROLIFERATIVE DISORDER (HCC): Primary | ICD-10-CM

## 2025-07-31 DIAGNOSIS — Z79.64 ENCOUNTER FOR MONITORING OF HYDROXYUREA THERAPY: ICD-10-CM

## 2025-07-31 LAB
ALBUMIN SERPL BCG-MCNC: 3.8 G/DL (ref 3.4–4.9)
ALP SERPL-CCNC: 64 U/L (ref 40–129)
ALT SERPL W/O P-5'-P-CCNC: 18 U/L (ref 10–50)
AST SERPL-CCNC: 26 U/L (ref 10–50)
BASOPHILS # BLD AUTO: 0 THOU/MM3 (ref 0–0.1)
BASOPHILS NFR BLD AUTO: 0 % (ref 0–3)
BILIRUB CONJ SERPL-MCNC: < 0.1 MG/DL (ref 0–0.2)
BILIRUB SERPL-MCNC: 0.3 MG/DL (ref 0.3–1.2)
BUN BLDP-MCNC: 24 MG/DL (ref 8–26)
CHLORIDE BLD-SCNC: 108 MEQ/L (ref 98–109)
CREAT BLD-MCNC: 1.1 MG/DL (ref 0.5–1.2)
EOSINOPHIL # BLD AUTO: 0.1 THOU/MM3 (ref 0–0.4)
EOSINOPHIL NFR BLD AUTO: 2 % (ref 0–4)
ERYTHROCYTE [DISTWIDTH] IN BLOOD BY AUTOMATED COUNT: 12.7 % (ref 11.5–14.5)
GFR SERPL CREATININE-BSD FRML MDRD: 70 ML/MIN/1.73M2
GLUCOSE BLD-MCNC: 91 MG/DL (ref 70–108)
HCT VFR BLD AUTO: 39.9 % (ref 42–52)
HGB BLD-MCNC: 13.4 GM/DL (ref 14–18)
IMM GRANULOCYTES # BLD AUTO: 0.01 THOU/MM3 (ref 0–0.07)
IMM GRANULOCYTES NFR BLD AUTO: 0 %
IONIZED CALCIUM, WHOLE BLOOD: 1.26 MMOL/L (ref 1.12–1.32)
LYMPHOCYTES # BLD AUTO: 1.2 THOU/MM3 (ref 1–4.8)
LYMPHOCYTES NFR BLD AUTO: 26 % (ref 15–47)
MCH RBC QN AUTO: 37.4 PG (ref 26–33)
MCHC RBC AUTO-ENTMCNC: 33.6 GM/DL (ref 32.2–35.5)
MCV RBC AUTO: 112 FL (ref 80–94)
MONOCYTES # BLD AUTO: 0.5 THOU/MM3 (ref 0.4–1.3)
MONOCYTES NFR BLD AUTO: 11 % (ref 0–12)
NEUTROPHILS ABSOLUTE: 2.9 THOU/MM3 (ref 1.8–7.7)
NEUTROPHILS NFR BLD AUTO: 60 % (ref 43–75)
PLATELET # BLD AUTO: 430 THOU/MM3 (ref 130–400)
PMV BLD AUTO: 9.7 FL (ref 9.4–12.4)
POTASSIUM BLD-SCNC: 4 MEQ/L (ref 3.5–4.9)
PROT SERPL-MCNC: 6.1 G/DL (ref 6.4–8.3)
RBC # BLD AUTO: 3.58 MILL/MM3 (ref 4.7–6.1)
SODIUM BLD-SCNC: 148 MEQ/L (ref 138–146)
TOTAL CO2, WHOLE BLOOD: 31 MEQ/L (ref 23–33)
WBC # BLD AUTO: 4.8 THOU/MM3 (ref 4.8–10.8)

## 2025-07-31 PROCEDURE — 1123F ACP DISCUSS/DSCN MKR DOCD: CPT | Performed by: NURSE PRACTITIONER

## 2025-07-31 PROCEDURE — 99211 OFF/OP EST MAY X REQ PHY/QHP: CPT

## 2025-07-31 PROCEDURE — 1126F AMNT PAIN NOTED NONE PRSNT: CPT | Performed by: NURSE PRACTITIONER

## 2025-07-31 PROCEDURE — 3017F COLORECTAL CA SCREEN DOC REV: CPT | Performed by: NURSE PRACTITIONER

## 2025-07-31 PROCEDURE — 80047 BASIC METABLC PNL IONIZED CA: CPT

## 2025-07-31 PROCEDURE — G8427 DOCREV CUR MEDS BY ELIG CLIN: HCPCS | Performed by: NURSE PRACTITIONER

## 2025-07-31 PROCEDURE — G8417 CALC BMI ABV UP PARAM F/U: HCPCS | Performed by: NURSE PRACTITIONER

## 2025-07-31 PROCEDURE — 1036F TOBACCO NON-USER: CPT | Performed by: NURSE PRACTITIONER

## 2025-07-31 PROCEDURE — 85025 COMPLETE CBC W/AUTO DIFF WBC: CPT

## 2025-07-31 PROCEDURE — 99214 OFFICE O/P EST MOD 30 MIN: CPT | Performed by: NURSE PRACTITIONER

## 2025-07-31 PROCEDURE — 80076 HEPATIC FUNCTION PANEL: CPT

## 2025-07-31 PROCEDURE — 1160F RVW MEDS BY RX/DR IN RCRD: CPT | Performed by: NURSE PRACTITIONER

## 2025-07-31 PROCEDURE — 1159F MED LIST DOCD IN RCRD: CPT | Performed by: NURSE PRACTITIONER

## 2025-07-31 RX ORDER — HYDROXYUREA 500 MG/1
500 CAPSULE ORAL 2 TIMES DAILY
Qty: 180 CAPSULE | Refills: 1 | Status: SHIPPED | OUTPATIENT
Start: 2025-07-31